# Patient Record
Sex: FEMALE | Race: WHITE | NOT HISPANIC OR LATINO | Employment: FULL TIME | URBAN - METROPOLITAN AREA
[De-identification: names, ages, dates, MRNs, and addresses within clinical notes are randomized per-mention and may not be internally consistent; named-entity substitution may affect disease eponyms.]

---

## 2021-07-29 ENCOUNTER — HOSPITAL ENCOUNTER (EMERGENCY)
Facility: HOSPITAL | Age: 31
Discharge: HOME | End: 2021-07-29
Admitting: EMERGENCY MEDICINE

## 2021-07-29 VITALS
RESPIRATION RATE: 18 BRPM | HEART RATE: 93 BPM | WEIGHT: 185 LBS | OXYGEN SATURATION: 97 % | TEMPERATURE: 96.9 F | BODY MASS INDEX: 34.93 KG/M2 | DIASTOLIC BLOOD PRESSURE: 79 MMHG | HEIGHT: 61 IN | SYSTOLIC BLOOD PRESSURE: 109 MMHG

## 2021-07-29 DIAGNOSIS — T19.2XXA FOREIGN BODY IN VAGINA, INITIAL ENCOUNTER: Primary | ICD-10-CM

## 2021-07-29 PROCEDURE — 99282 EMERGENCY DEPT VISIT SF MDM: CPT

## 2021-07-29 PROCEDURE — 99283 EMERGENCY DEPT VISIT LOW MDM: CPT

## 2023-06-14 ENCOUNTER — APPOINTMENT (OUTPATIENT)
Dept: CT IMAGING | Facility: HOSPITAL | Age: 33
End: 2023-06-14
Payer: MEDICAID

## 2023-06-14 ENCOUNTER — HOSPITAL ENCOUNTER (INPATIENT)
Facility: HOSPITAL | Age: 33
LOS: 2 days | Discharge: HOME | End: 2023-06-16
Attending: STUDENT IN AN ORGANIZED HEALTH CARE EDUCATION/TRAINING PROGRAM | Admitting: INTERNAL MEDICINE
Payer: MEDICAID

## 2023-06-14 DIAGNOSIS — Z87.891 HISTORY OF TOBACCO ABUSE: ICD-10-CM

## 2023-06-14 DIAGNOSIS — L97.929: Primary | ICD-10-CM

## 2023-06-14 DIAGNOSIS — L03.116 CELLULITIS OF LEFT LOWER EXTREMITY: ICD-10-CM

## 2023-06-14 DIAGNOSIS — F19.11 HISTORY OF SUBSTANCE ABUSE: ICD-10-CM

## 2023-06-14 DIAGNOSIS — M79.605 LEFT LEG PAIN: ICD-10-CM

## 2023-06-14 DIAGNOSIS — F31.77 BIPOLAR DISORDER, IN PARTIAL REMISSION, MOST RECENT EPISODE MIXED: ICD-10-CM

## 2023-06-14 DIAGNOSIS — Z86.59 HISTORY OF BIPOLAR DISORDER: ICD-10-CM

## 2023-06-14 LAB
ALBUMIN SERPL-MCNC: 4 G/DL (ref 3.5–5.2)
ALBUMIN/GLOB SERPL: 1.2 G/DL
ALP SERPL-CCNC: 84 U/L (ref 39–117)
ALT SERPL W P-5'-P-CCNC: 8 U/L (ref 1–33)
ANION GAP SERPL CALCULATED.3IONS-SCNC: 9 MMOL/L (ref 5–15)
AST SERPL-CCNC: 22 U/L (ref 1–32)
BASOPHILS # BLD AUTO: 0.06 10*3/MM3 (ref 0–0.2)
BASOPHILS NFR BLD AUTO: 0.6 % (ref 0–1.5)
BILIRUB SERPL-MCNC: 0.2 MG/DL (ref 0–1.2)
BUN SERPL-MCNC: 15 MG/DL (ref 6–20)
BUN/CREAT SERPL: 21.4 (ref 7–25)
CALCIUM SPEC-SCNC: 8.9 MG/DL (ref 8.6–10.5)
CHLORIDE SERPL-SCNC: 105 MMOL/L (ref 98–107)
CO2 SERPL-SCNC: 26 MMOL/L (ref 22–29)
CREAT SERPL-MCNC: 0.7 MG/DL (ref 0.57–1)
CRP SERPL-MCNC: 0.85 MG/DL (ref 0–0.5)
D-LACTATE SERPL-SCNC: 0.6 MMOL/L (ref 0.5–2)
DEPRECATED RDW RBC AUTO: 39.6 FL (ref 37–54)
EGFRCR SERPLBLD CKD-EPI 2021: 116.6 ML/MIN/1.73
EOSINOPHIL # BLD AUTO: 0.26 10*3/MM3 (ref 0–0.4)
EOSINOPHIL NFR BLD AUTO: 2.8 % (ref 0.3–6.2)
ERYTHROCYTE [DISTWIDTH] IN BLOOD BY AUTOMATED COUNT: 12.5 % (ref 12.3–15.4)
GLOBULIN UR ELPH-MCNC: 3.3 GM/DL
GLUCOSE SERPL-MCNC: 108 MG/DL (ref 65–99)
HCT VFR BLD AUTO: 40.3 % (ref 34–46.6)
HGB BLD-MCNC: 13.3 G/DL (ref 12–15.9)
HOLD SPECIMEN: NORMAL
HOLD SPECIMEN: NORMAL
IMM GRANULOCYTES # BLD AUTO: 0.04 10*3/MM3 (ref 0–0.05)
IMM GRANULOCYTES NFR BLD AUTO: 0.4 % (ref 0–0.5)
LYMPHOCYTES # BLD AUTO: 3.5 10*3/MM3 (ref 0.7–3.1)
LYMPHOCYTES NFR BLD AUTO: 37.7 % (ref 19.6–45.3)
MCH RBC QN AUTO: 28.7 PG (ref 26.6–33)
MCHC RBC AUTO-ENTMCNC: 33 G/DL (ref 31.5–35.7)
MCV RBC AUTO: 86.9 FL (ref 79–97)
MONOCYTES # BLD AUTO: 0.69 10*3/MM3 (ref 0.1–0.9)
MONOCYTES NFR BLD AUTO: 7.4 % (ref 5–12)
NEUTROPHILS NFR BLD AUTO: 4.73 10*3/MM3 (ref 1.7–7)
NEUTROPHILS NFR BLD AUTO: 51.1 % (ref 42.7–76)
NRBC BLD AUTO-RTO: 0 /100 WBC (ref 0–0.2)
PLATELET # BLD AUTO: 279 10*3/MM3 (ref 140–450)
PMV BLD AUTO: 9 FL (ref 6–12)
POTASSIUM SERPL-SCNC: 4 MMOL/L (ref 3.5–5.2)
PROT SERPL-MCNC: 7.3 G/DL (ref 6–8.5)
RBC # BLD AUTO: 4.64 10*6/MM3 (ref 3.77–5.28)
SODIUM SERPL-SCNC: 140 MMOL/L (ref 136–145)
WBC NRBC COR # BLD: 9.28 10*3/MM3 (ref 3.4–10.8)
WHOLE BLOOD HOLD COAG: NORMAL
WHOLE BLOOD HOLD SPECIMEN: NORMAL

## 2023-06-14 PROCEDURE — 25510000001 IOPAMIDOL 61 % SOLUTION: Performed by: STUDENT IN AN ORGANIZED HEALTH CARE EDUCATION/TRAINING PROGRAM

## 2023-06-14 PROCEDURE — 85025 COMPLETE CBC W/AUTO DIFF WBC: CPT

## 2023-06-14 PROCEDURE — 80053 COMPREHEN METABOLIC PANEL: CPT | Performed by: STUDENT IN AN ORGANIZED HEALTH CARE EDUCATION/TRAINING PROGRAM

## 2023-06-14 PROCEDURE — 84145 PROCALCITONIN (PCT): CPT | Performed by: PHYSICIAN ASSISTANT

## 2023-06-14 PROCEDURE — 73701 CT LOWER EXTREMITY W/DYE: CPT

## 2023-06-14 PROCEDURE — 87040 BLOOD CULTURE FOR BACTERIA: CPT | Performed by: PHYSICIAN ASSISTANT

## 2023-06-14 PROCEDURE — 87077 CULTURE AEROBIC IDENTIFY: CPT | Performed by: PHYSICIAN ASSISTANT

## 2023-06-14 PROCEDURE — 83605 ASSAY OF LACTIC ACID: CPT | Performed by: PHYSICIAN ASSISTANT

## 2023-06-14 PROCEDURE — 87205 SMEAR GRAM STAIN: CPT | Performed by: PHYSICIAN ASSISTANT

## 2023-06-14 PROCEDURE — 87070 CULTURE OTHR SPECIMN AEROBIC: CPT | Performed by: PHYSICIAN ASSISTANT

## 2023-06-14 PROCEDURE — 87186 SC STD MICRODIL/AGAR DIL: CPT | Performed by: PHYSICIAN ASSISTANT

## 2023-06-14 PROCEDURE — 86140 C-REACTIVE PROTEIN: CPT | Performed by: PHYSICIAN ASSISTANT

## 2023-06-14 PROCEDURE — 81025 URINE PREGNANCY TEST: CPT | Performed by: PHYSICIAN ASSISTANT

## 2023-06-14 RX ORDER — SACCHAROMYCES BOULARDII 250 MG
250 CAPSULE ORAL 2 TIMES DAILY
Status: DISCONTINUED | OUTPATIENT
Start: 2023-06-14 | End: 2023-06-16 | Stop reason: HOSPADM

## 2023-06-14 RX ORDER — NICOTINE 21 MG/24HR
1 PATCH, TRANSDERMAL 24 HOURS TRANSDERMAL
Status: DISCONTINUED | OUTPATIENT
Start: 2023-06-14 | End: 2023-06-16 | Stop reason: HOSPADM

## 2023-06-14 RX ORDER — SODIUM CHLORIDE 0.9 % (FLUSH) 0.9 %
10 SYRINGE (ML) INJECTION AS NEEDED
Status: DISCONTINUED | OUTPATIENT
Start: 2023-06-14 | End: 2023-06-16 | Stop reason: HOSPADM

## 2023-06-14 RX ADMIN — IOPAMIDOL 90 ML: 612 INJECTION, SOLUTION INTRAVENOUS at 22:36

## 2023-06-14 NOTE — Clinical Note
Level of Care: Telemetry [5]   Diagnosis: Non-healing ulcer of lower leg, left, with unspecified severity [[id number]]   Admitting Physician: AGUSTO FLOR [[id number]]   Attending Physician: AGUSTO FLOR [[id number]]   Certification: I Certify That Inpatient Hospital Services Are Medically Necessary For Greater Than 2 Midnights

## 2023-06-15 LAB
ALBUMIN SERPL-MCNC: 3.5 G/DL (ref 3.5–5.2)
ALBUMIN/GLOB SERPL: 1.2 G/DL
ALP SERPL-CCNC: 76 U/L (ref 39–117)
ALT SERPL W P-5'-P-CCNC: 5 U/L (ref 1–33)
AMPHET+METHAMPHET UR QL: NEGATIVE
AMPHETAMINES UR QL: NEGATIVE
ANION GAP SERPL CALCULATED.3IONS-SCNC: 7 MMOL/L (ref 5–15)
AST SERPL-CCNC: 21 U/L (ref 1–32)
B-HCG UR QL: NEGATIVE
BACTERIA UR QL AUTO: ABNORMAL /HPF
BARBITURATES UR QL SCN: NEGATIVE
BENZODIAZ UR QL SCN: NEGATIVE
BILIRUB SERPL-MCNC: 0.3 MG/DL (ref 0–1.2)
BILIRUB UR QL STRIP: NEGATIVE
BUN SERPL-MCNC: 13 MG/DL (ref 6–20)
BUN/CREAT SERPL: 19.4 (ref 7–25)
BUPRENORPHINE SERPL-MCNC: NEGATIVE NG/ML
CALCIUM SPEC-SCNC: 8.2 MG/DL (ref 8.6–10.5)
CANNABINOIDS SERPL QL: NEGATIVE
CHLORIDE SERPL-SCNC: 104 MMOL/L (ref 98–107)
CLARITY UR: CLEAR
CO2 SERPL-SCNC: 25 MMOL/L (ref 22–29)
COCAINE UR QL: NEGATIVE
COD CRY URNS QL: ABNORMAL /HPF
COLOR UR: YELLOW
CREAT SERPL-MCNC: 0.67 MG/DL (ref 0.57–1)
DEPRECATED RDW RBC AUTO: 39.4 FL (ref 37–54)
EGFRCR SERPLBLD CKD-EPI 2021: 117.8 ML/MIN/1.73
ERYTHROCYTE [DISTWIDTH] IN BLOOD BY AUTOMATED COUNT: 12.3 % (ref 12.3–15.4)
ERYTHROCYTE [SEDIMENTATION RATE] IN BLOOD: 15 MM/HR (ref 0–20)
EXPIRATION DATE: NORMAL
FENTANYL UR-MCNC: POSITIVE NG/ML
GLOBULIN UR ELPH-MCNC: 2.9 GM/DL
GLUCOSE SERPL-MCNC: 77 MG/DL (ref 65–99)
GLUCOSE UR STRIP-MCNC: NEGATIVE MG/DL
HCT VFR BLD AUTO: 37.9 % (ref 34–46.6)
HGB BLD-MCNC: 12.9 G/DL (ref 12–15.9)
HGB UR QL STRIP.AUTO: ABNORMAL
HOLD SPECIMEN: NORMAL
HYALINE CASTS UR QL AUTO: ABNORMAL /LPF
INTERNAL NEGATIVE CONTROL: NORMAL
INTERNAL POSITIVE CONTROL: NORMAL
KETONES UR QL STRIP: NEGATIVE
LEUKOCYTE ESTERASE UR QL STRIP.AUTO: NEGATIVE
Lab: NORMAL
MCH RBC QN AUTO: 29.5 PG (ref 26.6–33)
MCHC RBC AUTO-ENTMCNC: 34 G/DL (ref 31.5–35.7)
MCV RBC AUTO: 86.7 FL (ref 79–97)
METHADONE UR QL SCN: POSITIVE
NITRITE UR QL STRIP: NEGATIVE
OPIATES UR QL: NEGATIVE
OXYCODONE UR QL SCN: NEGATIVE
PCP UR QL SCN: NEGATIVE
PH UR STRIP.AUTO: 5.5 [PH] (ref 5–8)
PLATELET # BLD AUTO: 239 10*3/MM3 (ref 140–450)
PMV BLD AUTO: 9 FL (ref 6–12)
POTASSIUM SERPL-SCNC: 3.8 MMOL/L (ref 3.5–5.2)
PROCALCITONIN SERPL-MCNC: <0.02 NG/ML (ref 0–0.25)
PROPOXYPH UR QL: NEGATIVE
PROT SERPL-MCNC: 6.4 G/DL (ref 6–8.5)
PROT UR QL STRIP: NEGATIVE
RBC # BLD AUTO: 4.37 10*6/MM3 (ref 3.77–5.28)
RBC # UR STRIP: ABNORMAL /HPF
REF LAB TEST METHOD: ABNORMAL
SODIUM SERPL-SCNC: 136 MMOL/L (ref 136–145)
SP GR UR STRIP: 1.05 (ref 1–1.03)
SQUAMOUS #/AREA URNS HPF: ABNORMAL /HPF
TRICYCLICS UR QL SCN: NEGATIVE
UROBILINOGEN UR QL STRIP: ABNORMAL
WBC # UR STRIP: ABNORMAL /HPF
WBC NRBC COR # BLD: 6.81 10*3/MM3 (ref 3.4–10.8)

## 2023-06-15 PROCEDURE — 80307 DRUG TEST PRSMV CHEM ANLYZR: CPT | Performed by: PHYSICIAN ASSISTANT

## 2023-06-15 PROCEDURE — 25010000002 PIPERACILLIN SOD-TAZOBACTAM PER 1 G: Performed by: PHYSICIAN ASSISTANT

## 2023-06-15 PROCEDURE — 97597 DBRDMT OPN WND 1ST 20 CM/<: CPT

## 2023-06-15 PROCEDURE — 85027 COMPLETE CBC AUTOMATED: CPT | Performed by: PHYSICIAN ASSISTANT

## 2023-06-15 PROCEDURE — 97161 PT EVAL LOW COMPLEX 20 MIN: CPT

## 2023-06-15 PROCEDURE — 85652 RBC SED RATE AUTOMATED: CPT | Performed by: PHYSICIAN ASSISTANT

## 2023-06-15 PROCEDURE — 25010000002 CEFTRIAXONE PER 250 MG: Performed by: INTERNAL MEDICINE

## 2023-06-15 PROCEDURE — 81001 URINALYSIS AUTO W/SCOPE: CPT | Performed by: PHYSICIAN ASSISTANT

## 2023-06-15 PROCEDURE — 25010000002 VANCOMYCIN 10 G RECONSTITUTED SOLUTION: Performed by: PHYSICIAN ASSISTANT

## 2023-06-15 PROCEDURE — 80053 COMPREHEN METABOLIC PANEL: CPT | Performed by: PHYSICIAN ASSISTANT

## 2023-06-15 PROCEDURE — 25010000002 VANCOMYCIN 10 G RECONSTITUTED SOLUTION

## 2023-06-15 RX ORDER — NALOXONE HCL 0.4 MG/ML
0.4 VIAL (ML) INJECTION AS NEEDED
Status: DISCONTINUED | OUTPATIENT
Start: 2023-06-15 | End: 2023-06-16 | Stop reason: HOSPADM

## 2023-06-15 RX ORDER — SODIUM CHLORIDE 0.9 % (FLUSH) 0.9 %
10 SYRINGE (ML) INJECTION AS NEEDED
Status: DISCONTINUED | OUTPATIENT
Start: 2023-06-15 | End: 2023-06-16 | Stop reason: HOSPADM

## 2023-06-15 RX ORDER — BISACODYL 5 MG/1
5 TABLET, DELAYED RELEASE ORAL DAILY PRN
Status: DISCONTINUED | OUTPATIENT
Start: 2023-06-15 | End: 2023-06-16 | Stop reason: HOSPADM

## 2023-06-15 RX ORDER — ACETAMINOPHEN 325 MG/1
650 TABLET ORAL EVERY 4 HOURS PRN
Status: DISCONTINUED | OUTPATIENT
Start: 2023-06-15 | End: 2023-06-16 | Stop reason: HOSPADM

## 2023-06-15 RX ORDER — FLUOXETINE HYDROCHLORIDE 20 MG/1
20 CAPSULE ORAL DAILY
COMMUNITY

## 2023-06-15 RX ORDER — METHADONE HYDROCHLORIDE 10 MG/1
80 TABLET ORAL DAILY
Status: DISCONTINUED | OUTPATIENT
Start: 2023-06-15 | End: 2023-06-16 | Stop reason: HOSPADM

## 2023-06-15 RX ORDER — ONDANSETRON 2 MG/ML
4 INJECTION INTRAMUSCULAR; INTRAVENOUS EVERY 6 HOURS PRN
Status: DISCONTINUED | OUTPATIENT
Start: 2023-06-15 | End: 2023-06-16 | Stop reason: HOSPADM

## 2023-06-15 RX ORDER — SODIUM CHLORIDE 0.9 % (FLUSH) 0.9 %
10 SYRINGE (ML) INJECTION EVERY 12 HOURS SCHEDULED
Status: DISCONTINUED | OUTPATIENT
Start: 2023-06-15 | End: 2023-06-16 | Stop reason: HOSPADM

## 2023-06-15 RX ORDER — METHADONE HYDROCHLORIDE 10 MG/1
10 TABLET ORAL DAILY
COMMUNITY

## 2023-06-15 RX ORDER — HYDROXYZINE HYDROCHLORIDE 25 MG/1
25 TABLET, FILM COATED ORAL 3 TIMES DAILY PRN
COMMUNITY

## 2023-06-15 RX ORDER — BISACODYL 10 MG
10 SUPPOSITORY, RECTAL RECTAL DAILY PRN
Status: DISCONTINUED | OUTPATIENT
Start: 2023-06-15 | End: 2023-06-16 | Stop reason: HOSPADM

## 2023-06-15 RX ORDER — SODIUM CHLORIDE 9 MG/ML
40 INJECTION, SOLUTION INTRAVENOUS AS NEEDED
Status: DISCONTINUED | OUTPATIENT
Start: 2023-06-15 | End: 2023-06-16 | Stop reason: HOSPADM

## 2023-06-15 RX ORDER — CHOLECALCIFEROL (VITAMIN D3) 125 MCG
5 CAPSULE ORAL NIGHTLY PRN
Status: DISCONTINUED | OUTPATIENT
Start: 2023-06-15 | End: 2023-06-16 | Stop reason: HOSPADM

## 2023-06-15 RX ORDER — ARIPIPRAZOLE 15 MG/1
15 TABLET ORAL DAILY
COMMUNITY

## 2023-06-15 RX ORDER — BUSPIRONE HYDROCHLORIDE 10 MG/1
10 TABLET ORAL 2 TIMES DAILY
COMMUNITY

## 2023-06-15 RX ORDER — POLYETHYLENE GLYCOL 3350 17 G/17G
17 POWDER, FOR SOLUTION ORAL DAILY PRN
Status: DISCONTINUED | OUTPATIENT
Start: 2023-06-15 | End: 2023-06-16 | Stop reason: HOSPADM

## 2023-06-15 RX ORDER — CHOLECALCIFEROL (VITAMIN D3) 125 MCG
5 CAPSULE ORAL
COMMUNITY

## 2023-06-15 RX ORDER — AMOXICILLIN 250 MG
2 CAPSULE ORAL 2 TIMES DAILY
Status: DISCONTINUED | OUTPATIENT
Start: 2023-06-15 | End: 2023-06-16 | Stop reason: HOSPADM

## 2023-06-15 RX ADMIN — Medication 250 MG: at 21:05

## 2023-06-15 RX ADMIN — Medication 250 MG: at 03:21

## 2023-06-15 RX ADMIN — ACETAMINOPHEN 650 MG: 325 TABLET ORAL at 03:21

## 2023-06-15 RX ADMIN — ACETAMINOPHEN 650 MG: 325 TABLET ORAL at 21:05

## 2023-06-15 RX ADMIN — TAZOBACTAM SODIUM AND PIPERACILLIN SODIUM 3.38 G: 375; 3 INJECTION, SOLUTION INTRAVENOUS at 00:33

## 2023-06-15 RX ADMIN — VANCOMYCIN HYDROCHLORIDE 1250 MG: 10 INJECTION, POWDER, LYOPHILIZED, FOR SOLUTION INTRAVENOUS at 11:44

## 2023-06-15 RX ADMIN — Medication 1 PATCH: at 00:33

## 2023-06-15 RX ADMIN — Medication 5 MG: at 03:21

## 2023-06-15 RX ADMIN — VANCOMYCIN HYDROCHLORIDE 1750 MG: 10 INJECTION, POWDER, LYOPHILIZED, FOR SOLUTION INTRAVENOUS at 01:23

## 2023-06-15 RX ADMIN — Medication 10 ML: at 08:22

## 2023-06-15 RX ADMIN — Medication 250 MG: at 08:19

## 2023-06-15 RX ADMIN — Medication 5 MG: at 21:04

## 2023-06-15 RX ADMIN — METHADONE HYDROCHLORIDE 80 MG: 10 TABLET ORAL at 10:41

## 2023-06-15 RX ADMIN — SODIUM CHLORIDE 2 G: 900 INJECTION INTRAVENOUS at 08:20

## 2023-06-15 RX ADMIN — ACETAMINOPHEN 650 MG: 325 TABLET ORAL at 12:42

## 2023-06-15 RX ADMIN — SENNOSIDES AND DOCUSATE SODIUM 2 TABLET: 50; 8.6 TABLET ORAL at 08:19

## 2023-06-15 NOTE — NURSING NOTE
WOC consulted for: LLE wound    Upon review of chart, Vascular surgery has been consulted but not yet seen the patient.  PT Wound care have also been consulted for same wound, wound culture has already been collected.  Given this information and discussion with PT Wound Care therapist, PT Wound Care will continue to follow.  Informed bedside RN that PT Wound care would be following this patient @ 10:43.    Sensory Perception: 4-->no impairment  Moisture: 4-->rarely moist  Activity: 4-->walks frequently  Mobility: 4-->no limitation  Nutrition: 3-->adequate  Friction and Shear: 3-->no apparent problem  Ministerio Score: 22 (06/15/23 0400)    Please implement pressure injury interventions per protocol.    See order recommendations.    Thank you for the consult.  If alteration to skin integrity or worsening wound presentation, please contact WOC team.

## 2023-06-15 NOTE — PLAN OF CARE
Goal Outcome Evaluation:  Plan of Care Reviewed With: patient        Progress: improving  Outcome Evaluation: VSS on RA. A&Ox4. up ad kourtney in room. PT wound consulted. PT tolerated debridement and dressin of LLE wound. Methadone restarted at 80m per DR. Marsh. NO other needs at this time.

## 2023-06-15 NOTE — CASE MANAGEMENT/SOCIAL WORK
Discharge Planning Assessment  [facility]     Patient Name: Elda Salazar  MRN: [id number]  Today's Date: 6/15/2023    Admit Date: 6/14/2023    Plan: discharge plan   Discharge Needs Assessment       Row Name 06/15/23 1214       Living Environment    People in Home spouse    Name(s) of People in Home Carlos Salazar(spouse)    Potentially Unsafe Housing Conditions none    Primary Care Provided by spouse/significant other    Provides Primary Care For no one    Family Caregiver if Needed spouse    Family Caregiver Names Carlos Salazar(spouse)    Quality of Family Relationships supportive    Able to Return to Prior Arrangements yes    Living Arrangement Comments I met with pt at bedside with permission regarding discharge plan. Pt resides in [facility] with spouse, Carlos       Resource/Environmental Concerns    Resource/Environmental Concerns other (see comments)  Denies concerns       Food Insecurity    Within the past 12 months, you worried that your food would run out before you got the money to buy more. Never true       Transition Planning    Patient/Family Anticipates Transition to home with family    Patient/Family Anticipated Services at Transition     Transportation Anticipated car, drives self;family or friend will provide       Discharge Needs Assessment    Readmission Within the Last 30 Days no previous admission in last 30 days    Current Outpatient/Agency/Support Group outpatient substance abuse treatment    Equipment Currently Used at Home none    Concerns to be Addressed discharge planning    Anticipated Changes Related to Illness other (see comments)  Denies    Equipment Needed After Discharge none    Discharge Coordination/Progress Pt confirms that she has [facility] [employer] [facility] [place] insurane with prescription coverage. Pt uses [facility] on [address]. Pt is independent with ADLs and denies using any HH or DME. Pt has a history of bipolar disorder, prior  history of substance abule  and is here with non healing ulcer of lower leg. Plan is home at discharge. If wound therapy is needed at discharge, pt reports she can go outpatient. CM will cont to follow                   Discharge Plan       Row Name 06/15/23 1220       Plan    Plan discharge plan    Plan Comments Pt has a history of bipolar disorder, prior history of substance abule and is here with non healing ulcer of lower leg. Plan is home at discharge. If wound therapy is needed at discharge, pt reports she can go outpatient. CM will cont to follow    Final Discharge Disposition Code 01 - home or self-care                  Continued Care and Services - Admitted Since 6/14/2023    Coordination has not been started for this encounter.       Expected Discharge Date and Time       Expected Discharge Date Expected Discharge Time    Jun 19, 2023            Demographic Summary       Row Name 06/15/23 1212       General Information    General Information Comments PCP is BRENDA CASTILLO       Contact Information    Permission Granted to Share Info With     Contact Information Obtained for     Contact Information Comments aCrlos Salazar(spouse) [phone]                   Functional Status       Row Name 06/15/23 1213       Functional Status    Functional Status Comments Pt reports she is independent with ADLs                   Psychosocial    No documentation.                  Abuse/Neglect    No documentation.                  Legal    No documentation.                  Substance Abuse    No documentation.                  Patient Forms    No documentation.                     Ella Price, RN

## 2023-06-15 NOTE — H&P
"    [facility] Medicine Services  HISTORY AND PHYSICAL    Patient Name: Elda Salazar  : 1990  MRN: [id number]  Primary Care Physician: Genaro Baker MD  Date of admission: 2023      Subjective   Subjective     Chief Complaint:  Left leg wound, pain    HPI:  Elda Salazar is a 33 y.o. female with PMH of bipolar disorder, reported prior hx of substance abuse, chronic ongoing tobacco use who presents to the ER with complaints of wound to the posterior aspect of LLE at the calf with increasing pain, erythema, drainage, and foul odor over the last several days.     Patient is currently somnolent and fatigued and is not very interested in providing much history at this time. When asked when her wound to her left lower extremity started, she responds, \" I dont really know\".  She told the ER the wound started about 1 week ago.  She reports to me that she suffered a shaving accident and the wound has progressively worsened since that time. She has had worsening edema, warmth, tenderness, and foul-smelling drainage.  Denies any fever or chills.  Denies any other symptoms at this time.      Review of Systems   Gen- No fevers, chills  CV- No chest pain, palpitations  Resp- No cough, dyspnea  GI- No N/V/D, abd pain      Personal History     Past Medical History:   Diagnosis Date    Bipolar disorder     Polysubstance abuse              Past Surgical History:   Procedure Laterality Date    TONSILLECTOMY         Family History: family history includes Hypertension in her maternal grandmother.     Social History:  reports that she has been smoking cigarettes. She has been smoking an average of 1 pack per day. She does not have any smokeless tobacco history on file. She reports that she does not currently use alcohol. She reports that she does not currently use drugs.  Social History     Social History Narrative    Not on file       Medications:  Available home medication information " reviewed.  (Not in a hospital admission)      No Known Allergies    Objective   Objective     Vital Signs:   Temp:  [98.7 °F (37.1 °C)] 98.7 °F (37.1 °C)  Heart Rate:  [89] 89  Resp:  [20] 20  BP: (123)/(62) 123/62       Physical Exam   Constitutional: Somnolent, appears fatigued, will wake to verbal stimuli  Eyes: PERRLA, sclerae anicteric, no conjunctival injection  HENT: NCAT, mucous membranes moist  Neck: Supple, no thyromegaly, no lymphadenopathy, trachea midline  Respiratory: Clear to auscultation bilaterally, nonlabored respirations   Cardiovascular: RRR, no murmurs, rubs, or gallops, palpable pedal pulses bilaterally  Gastrointestinal: Positive bowel sounds, soft, nontender, nondistended  Musculoskeletal: No bilateral ankle edema, no clubbing or cyanosis to extremities  Psychiatric: Appropriate affect, cooperative  Neurologic: Oriented x 3, strength symmetric in all extremities, Cranial Nerves grossly intact to confrontation, speech clear  Skin: No rashes, LLE with ulcer to posterior calf with eschar/black necrosis centrally with foul odor and scant yellow bloody drainage, increased tenderness around wound without induration or crepitus, noted erythema around wound consistent with acute cellulitis with well demarcated margins      Result Review:  I have personally reviewed the results from the time of this admission to 6/14/2023 23:59 EDT and agree with these findings:  [x]  Laboratory list / accordion  []  Microbiology  [x]  Radiology  []  EKG/Telemetry   []  Cardiology/Vascular   []  Pathology  []  Old records  []  Other:  Most notable findings include:  CT with no evidence of abscess or gas.  WBC normal.  All personally reviewed        LAB RESULTS:      Lab 06/14/23 2039   WBC 9.28   HEMOGLOBIN 13.3   HEMATOCRIT 40.3   PLATELETS 279   NEUTROS ABS 4.73   IMMATURE GRANS (ABS) 0.04   LYMPHS ABS 3.50*   MONOS ABS 0.69   EOS ABS 0.26   MCV 86.9   CRP 0.85*   LACTATE 0.6         Lab 06/14/23 2039   SODIUM 140    POTASSIUM 4.0   CHLORIDE 105   CO2 26.0   ANION GAP 9.0   BUN 15   CREATININE 0.70   EGFR 116.6   GLUCOSE 108*   CALCIUM 8.9         Lab 06/14/23 2039   TOTAL PROTEIN 7.3   ALBUMIN 4.0   GLOBULIN 3.3   ALT (SGPT) 8   AST (SGOT) 22   BILIRUBIN 0.2   ALK PHOS 84                         Microbiology Results (last 10 days)       ** No results found for the last 240 hours. **            CT Lower Extremity Left With Contrast    Result Date: 6/14/2023  CT LOWER EXTREMITY LEFT W CONTRAST Date of Exam: 6/14/2023 10:31 PM EDT Indication: Non-healing wound of left calf. Comparison: None available. Technique: Axial CT images were obtained of the left lower extremity after the uneventful intravenous administration of 90 mL Isovue 300.  Reconstructed coronal and sagittal images were also obtained. Automated exposure control and iterative construction  methods were used. Findings: The osseous structures of the calf are intact, without evidence of fracture or suspicious lytic or sclerotic lesion. Consistent with provided history, there is a small focal area of ulceration noted posteriorly, with some mild underlying soft tissue thickening present. There is no evidence of focal fluid collection more specifically concerning for abscess and inflammatory change does not definitely extend deep to the adjacent fascia.     Impression: Impression: 14 mm ulcerated area along the posterior calf with some mild soft tissue edema consistent with provided history. There is no evidence of focal fluid collection concerning for abscess. Electronically Signed: Nilesh Werner  6/14/2023 10:54 PM EDT  Workstation ID: [id number]         Assessment & Plan   Assessment & Plan     Active Hospital Problems    Diagnosis  POA    **Non-healing ulcer of lower leg, left, with unspecified severity [L97.929]  Yes    History of substance abuse [F19.11]  Unknown    Bipolar disorder, in partial remission, most recent episode mixed [F31.77]  Unknown    Cellulitis of  "left lower extremity [L03.116]  Unknown     Pt is a 33 F with PMH of bipolar disorder, reported prior hx of substance abuse, chronic ongoing tobacco use who presents to the ER with complaints of wound to the posterior aspect of LLE at the calf with increasing pain, erythema, drainage, and foul odor over the last several days.     LLE open wound with cellulitis  --wound with eschar/necrotic tissue centrally. Will need debridement.  --Dr. Carroll agrees to follow. Appreciate assistance  --Vancomycin, rocephin 2g.   --wound and blood cultures.  --NPO until Dr. Carroll can eval    Bipolar disorder, mixed  --obtain home med list in am. Uses [facility]  --pt claims to be on unknown dose of abilify.  Busipirone 10mg bid, and hydroxyzine 25 mg bid    Hx of substance abuse  --reports she is on methadone 105mg daily.  Follows with \"[facility]\". Attempt to confirm in am  --reports cessation for 1.5 years. UDS pending    Chronic tobacco use  --nicotine patch  --cessation counseling    Total time spent: 75 minutes  Time spent includes time reviewing chart, face-to-face time, counseling patient/family/caregiver, ordering medications/tests/procedures, communicating with other health care professionals, documenting clinical information in the electronic health record, and coordination of care.      DVT prophylaxis: heparin      CODE STATUS: full code  Code Status and Medical Interventions:   Ordered at: 06/14/23 2330     Level Of Support Discussed With:    Patient     Code Status (Patient has no pulse and is not breathing):    CPR (Attempt to Resuscitate)     Medical Interventions (Patient has pulse or is breathing):    Full Support       Expected Discharge   Expected Discharge Date: 6/18/2023; Expected Discharge Time:      Esteban Cooper DO  06/14/23'  "

## 2023-06-15 NOTE — PAYOR COMM NOTE
"Aishwarya Pantoja (33 y.o. Female)     [id number]     Elizabet Hussein, TRICIA  Utilization Review  Klres-[phone]  [phone]        Date of Birth   1990    Social Security Number       [place]   [address] [facility] [address]    Home Phone   [phone]    MRN   [id number]       Episcopal   Latter day    Marital Status                               Admission Date   23    Admission Type   Emergency    Admitting Provider   Marilyn Marsh MD    Attending Provider   Marilyn Marsh MD    Department, Room/Bed   [facility] 6A, N604/1       Discharge Date       Discharge Disposition       Discharge Destination                                 Attending Provider: Marilyn Marsh MD    Allergies: No Known Allergies    Isolation: None   Infection: None   Code Status: CPR    Ht: 154.9 cm (61\")   Wt: 89.1 kg (196 lb 6.4 oz)    Admission Cmt: None   Principal Problem: Non-healing ulcer of lower leg, left, with unspecified severity [L97.929]                   Active Insurance as of 2023       Primary Coverage       Payor Plan Insurance Group Employer/Plan Group    [facility] COMMUNITY PLAN [facility] COMMUNITY PLAN Levine, Susan. \Hospital Has a New Name and Outlook.\""       Payor Plan Address Payor Plan Phone Number Payor Plan Fax Number Effective Dates    [address]   2023 - None Entered    [facility] [address]         Subscriber Name Subscriber Birth Date Member ID       AISHWARYA PANTOJA 1990 [id number]                     Emergency Contacts        (Rel.) Home Phone Work Phone Mobile Phone    YVROSE PANTOJA (Spouse) [phone] -- --                 History & Physical        Esteban Cooper DO at 23 [facility]              [facility]  HISTORY AND PHYSICAL    Patient Name: Aishwarya Pantoja  : 1990  MRN: [id number]  Primary Care Physician: Genaro Baker MD  Date of admission: 2023      Subjective   Subjective     Chief Complaint:  Left leg wound, " "pain    HPI:  Elda Salazar is a 33 y.o. female with PMH of bipolar disorder, reported prior hx of substance abuse, chronic ongoing tobacco use who presents to the ER with complaints of wound to the posterior aspect of LLE at the calf with increasing pain, erythema, drainage, and foul odor over the last several days.     Patient is currently somnolent and fatigued and is not very interested in providing much history at this time. When asked when her wound to her left lower extremity started, she responds, \" I dont really know\".  She told the ER the wound started about 1 week ago.  She reports to me that she suffered a shaving accident and the wound has progressively worsened since that time. She has had worsening edema, warmth, tenderness, and foul-smelling drainage.  Denies any fever or chills.  Denies any other symptoms at this time.      Review of Systems   Gen- No fevers, chills  CV- No chest pain, palpitations  Resp- No cough, dyspnea  GI- No N/V/D, abd pain      Personal History     Past Medical History:   Diagnosis Date    Bipolar disorder     Polysubstance abuse              Past Surgical History:   Procedure Laterality Date    TONSILLECTOMY         Family History: family history includes Hypertension in her maternal grandmother.     Social History:  reports that she has been smoking cigarettes. She has been smoking an average of 1 pack per day. She does not have any smokeless tobacco history on file. She reports that she does not currently use alcohol. She reports that she does not currently use drugs.  Social History     Social History Narrative    Not on file       Medications:  Available home medication information reviewed.  (Not in a hospital admission)      No Known Allergies    Objective   Objective     Vital Signs:   Temp:  [98.7 °F (37.1 °C)] 98.7 °F (37.1 °C)  Heart Rate:  [89] 89  Resp:  [20] 20  BP: (123)/(62) 123/62       Physical Exam   Constitutional: Somnolent, appears fatigued, will wake to " verbal stimuli  Eyes: PERRLA, sclerae anicteric, no conjunctival injection  HENT: NCAT, mucous membranes moist  Neck: Supple, no thyromegaly, no lymphadenopathy, trachea midline  Respiratory: Clear to auscultation bilaterally, nonlabored respirations   Cardiovascular: RRR, no murmurs, rubs, or gallops, palpable pedal pulses bilaterally  Gastrointestinal: Positive bowel sounds, soft, nontender, nondistended  Musculoskeletal: No bilateral ankle edema, no clubbing or cyanosis to extremities  Psychiatric: Appropriate affect, cooperative  Neurologic: Oriented x 3, strength symmetric in all extremities, Cranial Nerves grossly intact to confrontation, speech clear  Skin: No rashes, LLE with ulcer to posterior calf with eschar/black necrosis centrally with foul odor and scant yellow bloody drainage, increased tenderness around wound without induration or crepitus, noted erythema around wound consistent with acute cellulitis with well demarcated margins      Result Review:  I have personally reviewed the results from the time of this admission to 6/14/2023 23:59 EDT and agree with these findings:  [x]  Laboratory list / accordion  []  Microbiology  [x]  Radiology  []  EKG/Telemetry   []  Cardiology/Vascular   []  Pathology  []  Old records  []  Other:  Most notable findings include:  CT with no evidence of abscess or gas.  WBC normal.  All personally reviewed        LAB RESULTS:      Lab 06/14/23 2039   WBC 9.28   HEMOGLOBIN 13.3   HEMATOCRIT 40.3   PLATELETS 279   NEUTROS ABS 4.73   IMMATURE GRANS (ABS) 0.04   LYMPHS ABS 3.50*   MONOS ABS 0.69   EOS ABS 0.26   MCV 86.9   CRP 0.85*   LACTATE 0.6         Lab 06/14/23 2039   SODIUM 140   POTASSIUM 4.0   CHLORIDE 105   CO2 26.0   ANION GAP 9.0   BUN 15   CREATININE 0.70   EGFR 116.6   GLUCOSE 108*   CALCIUM 8.9         Lab 06/14/23 2039   TOTAL PROTEIN 7.3   ALBUMIN 4.0   GLOBULIN 3.3   ALT (SGPT) 8   AST (SGOT) 22   BILIRUBIN 0.2   ALK PHOS 84                          Microbiology Results (last 10 days)       ** No results found for the last 240 hours. **            CT Lower Extremity Left With Contrast    Result Date: 6/14/2023  CT LOWER EXTREMITY LEFT W CONTRAST Date of Exam: 6/14/2023 10:31 PM EDT Indication: Non-healing wound of left calf. Comparison: None available. Technique: Axial CT images were obtained of the left lower extremity after the uneventful intravenous administration of 90 mL Isovue 300.  Reconstructed coronal and sagittal images were also obtained. Automated exposure control and iterative construction  methods were used. Findings: The osseous structures of the calf are intact, without evidence of fracture or suspicious lytic or sclerotic lesion. Consistent with provided history, there is a small focal area of ulceration noted posteriorly, with some mild underlying soft tissue thickening present. There is no evidence of focal fluid collection more specifically concerning for abscess and inflammatory change does not definitely extend deep to the adjacent fascia.     Impression: Impression: 14 mm ulcerated area along the posterior calf with some mild soft tissue edema consistent with provided history. There is no evidence of focal fluid collection concerning for abscess. Electronically Signed: Nilesh Werner  6/14/2023 10:54 PM EDT  Workstation ID: [id number]         Assessment & Plan   Assessment & Plan     Active Hospital Problems    Diagnosis  POA    **Non-healing ulcer of lower leg, left, with unspecified severity [L97.929]  Yes    History of substance abuse [F19.11]  Unknown    Bipolar disorder, in partial remission, most recent episode mixed [F31.77]  Unknown    Cellulitis of left lower extremity [L03.116]  Unknown     Pt is a 33 F with PMH of bipolar disorder, reported prior hx of substance abuse, chronic ongoing tobacco use who presents to the ER with complaints of wound to the posterior aspect of LLE at the calf with increasing pain, erythema,  "drainage, and foul odor over the last several days.     LLE open wound with cellulitis  --wound with eschar/necrotic tissue centrally. Will need debridement.  --Dr. Carroll agrees to follow. Appreciate assistance  --Vancomycin, rocephin 2g.   --wound and blood cultures.  --NPO until Dr. Carroll can eval    Bipolar disorder, mixed  --obtain home med list in am. Uses [facility]  --pt claims to be on unknown dose of abilify.  Busipirone 10mg bid, and hydroxyzine 25 mg bid    Hx of substance abuse  --reports she is on methadone 105mg daily.  Follows with \"[facility]\". Attempt to confirm in am  --reports cessation for 1.5 years. UDS pending    Chronic tobacco use  --nicotine patch  --cessation counseling    Total time spent: 75 minutes  Time spent includes time reviewing chart, face-to-face time, counseling patient/family/caregiver, ordering medications/tests/procedures, communicating with other health care professionals, documenting clinical information in the electronic health record, and coordination of care.      DVT prophylaxis: heparin      CODE STATUS: full code  Code Status and Medical Interventions:   Ordered at: 06/14/23 2330     Level Of Support Discussed With:    Patient     Code Status (Patient has no pulse and is not breathing):    CPR (Attempt to Resuscitate)     Medical Interventions (Patient has pulse or is breathing):    Full Support       Expected Discharge   Expected Discharge Date: 6/18/2023; Expected Discharge Time:      Esteban Cooper DO  06/14/23'    Electronically signed by Esteban Cooper DO at 06/15/23 0005          Emergency Department Notes        Rosario Schilling Paramedic at 06/14/23 2145          Pt aware of  need for urine sample    Electronically signed by Rosario Schilling Paramedic at 06/14/23 2145       Khari Neff PA at 06/14/23 2111       Attestation signed by Gerald Bailey MD at 06/15/23 0645        NON FACE TO FACE: This visit was performed by " "BOTH a physician and an APC. I performed all aspects of the MDM as documented.  Gerald Bailey MD 6/15/2023 06:45 EDT                          EMERGENCY DEPARTMENT ENCOUNTER    Pt Name: Elda Salazar  MRN: [id number]  Pt :   1990  Room Number:    Date of encounter:  2023  PCP: Genaro Baker MD  ED Provider: BRAXTON Saucedo    Historian: Patient    HPI:  Chief Complaint: Left Leg Wound    Context: Elda Salazar is a 33 y.o. female who presents to the ED c/o a nonhealing wound to her left lower extremity.  Patient reports that approximately 1 week ago after shaving she noticed a small wound that has significantly grown in size and depth since that time she noticed it.  She states that she has been working on it and it is caused her increased discomfort.  Patient states that she wrapped the wound and noticed that has been draining and smells bad.  Patient has no significant past medical history.  Only prior diagnosis of bipolar disorder.  She denies any additional complaints on interview and exam.  HPI     REVIEW OF SYSTEMS  A chief complaint appropriate review of systems was completed and is negative except as noted in the HPI.     PAST MEDICAL HISTORY  Past Medical History:   Diagnosis Date    Bipolar disorder     Polysubstance abuse        PAST SURGICAL HISTORY  Past Surgical History:   Procedure Laterality Date    TONSILLECTOMY         FAMILY HISTORY  Family History   Problem Relation Age of Onset    Hypertension Maternal Grandmother        SOCIAL HISTORY  Social History     Socioeconomic History    Marital status:    Tobacco Use    Smoking status: Every Day     Packs/day: 1.00     Types: Cigarettes   Substance and Sexual Activity    Alcohol use: Not Currently    Drug use: Not Currently     Comment: reports 1.5 years \"clean\" from methamphetamine and heroin    Sexual activity: Defer       ALLERGIES  Patient has no known allergies.    PHYSICAL EXAM  Physical Exam  Vitals and " nursing note reviewed.   Constitutional:       General: She is not in acute distress.     Appearance: Normal appearance. She is not ill-appearing.   HENT:      Head: Normocephalic and atraumatic.      Nose: Nose normal.      Mouth/Throat:      Mouth: Mucous membranes are moist.   Eyes:      Extraocular Movements: Extraocular movements intact.   Cardiovascular:      Rate and Rhythm: Normal rate.   Pulmonary:      Effort: Pulmonary effort is normal.   Abdominal:      General: There is no distension.   Musculoskeletal:         General: Normal range of motion.      Cervical back: Normal range of motion and neck supple.      Comments: Approximate 2 cm wound to posterior of left calf. ##Please find photo detailing area of specific concern below##   Skin:     General: Skin is warm and dry.   Neurological:      General: No focal deficit present.      Mental Status: She is alert.   Psychiatric:         Mood and Affect: Mood normal.         Behavior: Behavior normal.         LAB RESULTS  Results for orders placed or performed during the hospital encounter of 06/14/23   Comprehensive Metabolic Panel    Specimen: Blood   Result Value Ref Range    Glucose 108 (H) 65 - 99 mg/dL    BUN 15 6 - 20 mg/dL    Creatinine 0.70 0.57 - 1.00 mg/dL    Sodium 140 136 - 145 mmol/L    Potassium 4.0 3.5 - 5.2 mmol/L    Chloride 105 98 - 107 mmol/L    CO2 26.0 22.0 - 29.0 mmol/L    Calcium 8.9 8.6 - 10.5 mg/dL    Total Protein 7.3 6.0 - 8.5 g/dL    Albumin 4.0 3.5 - 5.2 g/dL    ALT (SGPT) 8 1 - 33 U/L    AST (SGOT) 22 1 - 32 U/L    Alkaline Phosphatase 84 39 - 117 U/L    Total Bilirubin 0.2 0.0 - 1.2 mg/dL    Globulin 3.3 gm/dL    A/G Ratio 1.2 g/dL    BUN/Creatinine Ratio 21.4 7.0 - 25.0    Anion Gap 9.0 5.0 - 15.0 mmol/L    eGFR 116.6 >60.0 mL/min/1.73   CBC Auto Differential    Specimen: Blood   Result Value Ref Range    WBC 9.28 3.40 - 10.80 10*3/mm3    RBC 4.64 3.77 - 5.28 10*6/mm3    Hemoglobin 13.3 12.0 - 15.9 g/dL    Hematocrit 40.3 34.0  - 46.6 %    MCV 86.9 79.0 - 97.0 fL    MCH 28.7 26.6 - 33.0 pg    MCHC 33.0 31.5 - 35.7 g/dL    RDW 12.5 12.3 - 15.4 %    RDW-SD 39.6 37.0 - 54.0 fl    MPV 9.0 6.0 - 12.0 fL    Platelets 279 140 - 450 10*3/mm3    Neutrophil % 51.1 42.7 - 76.0 %    Lymphocyte % 37.7 19.6 - 45.3 %    Monocyte % 7.4 5.0 - 12.0 %    Eosinophil % 2.8 0.3 - 6.2 %    Basophil % 0.6 0.0 - 1.5 %    Immature Grans % 0.4 0.0 - 0.5 %    Neutrophils, Absolute 4.73 1.70 - 7.00 10*3/mm3    Lymphocytes, Absolute 3.50 (H) 0.70 - 3.10 10*3/mm3    Monocytes, Absolute 0.69 0.10 - 0.90 10*3/mm3    Eosinophils, Absolute 0.26 0.00 - 0.40 10*3/mm3    Basophils, Absolute 0.06 0.00 - 0.20 10*3/mm3    Immature Grans, Absolute 0.04 0.00 - 0.05 10*3/mm3    nRBC 0.0 0.0 - 0.2 /100 WBC   Lactic Acid, Plasma    Specimen: Blood   Result Value Ref Range    Lactate 0.6 0.5 - 2.0 mmol/L   Sedimentation Rate    Specimen: Blood   Result Value Ref Range    Sed Rate 15 0 - 20 mm/hr   C-reactive Protein    Specimen: Blood   Result Value Ref Range    C-Reactive Protein 0.85 (H) 0.00 - 0.50 mg/dL   Procalcitonin    Specimen: Blood   Result Value Ref Range    Procalcitonin <0.02 0.00 - 0.25 ng/mL   POC Urine Pregnancy    Specimen: Urine   Result Value Ref Range    HCG, Urine, QL Negative Negative    Lot Number [id number]     Internal Positive Control Passed Positive, Passed    Internal Negative Control Passed Negative, Passed    Expiration Date 08/03/2024    Green Top (Gel)   Result Value Ref Range    Extra Tube Hold for add-ons.    Lavender Top   Result Value Ref Range    Extra Tube hold for add-on    Gold Top - SST   Result Value Ref Range    Extra Tube Hold for add-ons.    Gray Top   Result Value Ref Range    Extra Tube Hold for add-ons.    Light Blue Top   Result Value Ref Range    Extra Tube Hold for add-ons.        If labs were ordered, I independently reviewed the results and considered them in treating the patient.    RADIOLOGY  CT Lower Extremity Left With Contrast    Final Result   Impression:   14 mm ulcerated area along the posterior calf with some mild soft tissue edema consistent with provided history. There is no evidence of focal fluid collection concerning for abscess.            Electronically Signed: Nilesh Werner     6/14/2023 10:54 PM EDT     Workstation ID: [id number]        [x] Radiologist's Report Reviewed:  I ordered and independently reviewed the above noted radiographic studies.  See radiologist's dictation for official interpretation.      PROCEDURES    Procedures    No orders to display       MEDICATIONS GIVEN IN ER    Medications   sodium chloride 0.9 % flush 10 mL (has no administration in time range)   sodium chloride 0.9 % flush 10 mL (has no administration in time range)   vancomycin 1750 mg/500 mL 0.9% NS IVPB ([facility]) (has no administration in time range)   Pharmacy to dose vancomycin (has no administration in time range)   cefTRIAXone (ROCEPHIN) 2 g/100 mL 0.9% NS IVPB (MBP) (has no administration in time range)   saccharomyces boulardii (FLORASTOR) capsule 250 mg (has no administration in time range)   nicotine (NICODERM CQ) 21 MG/24HR patch 1 patch (1 patch Transdermal Medication Applied 6/15/23 0033)   iopamidol (ISOVUE-300) 61 % injection 100 mL (90 mL Intravenous Given 6/14/23 2236)   piperacillin-tazobactam (ZOSYN) 3.375 g in iso-osmotic dextrose 50 ml (premix) (0 g Intravenous Stopped 6/15/23 0112)       MEDICAL DECISION MAKING, PROGRESS, and CONSULTS   Medical Decision Making  Problems Addressed:  History of bipolar disorder: chronic illness or injury  History of tobacco abuse: chronic illness or injury  Left leg pain: complicated acute illness or injury  Non-healing ulcer of lower leg, left, with unspecified severity: complicated acute illness or injury    Amount and/or Complexity of Data Reviewed  Labs: ordered.  Radiology: ordered.    Risk  Prescription drug management.  Decision regarding hospitalization.        All labs have been  independently reviewed by me.  All radiology studies have been reviewed by me and the radiologist dictating the report.  All EKG's have been independently viewed by me.    [] Discussed with radiology regarding test interpretation:    Discussion below represents my analysis of pertinent findings related to patient's condition, differential diagnosis, treatment plan and final disposition.    Differential diagnosis:  The differential diagnosis associated with the patient's presentation includes: Cellulitis, bug bite reaction, impetigo, drug eruption, ringworm or other fungal infection.     Additional sources  Discussed/ obtained information from independent historians:   [] Spouse  [] Parent  [] Family member  [] Friend  [] EMS   [] Other:  External (non-ED) record review:   [] Inpatient record:   [] Office record:   [] Outpatient record:   [] Prior Outpatient labs:   [] Prior Outpatient radiology:   [] Primary Care record:   [] Outside ED record:   [] Other:   Patient's care impacted by:   [] Diabetes  [] Hypertension  [] Hyperlipidemia  [] Hypothyroidism   [] Coronary Artery Disease   [] COPD   [] Cancer   [] Obesity  [] GERD   [x] Tobacco Abuse   [] Substance Abuse   [] Anxiety   [] Depression   [x] Other: Bipolar disorder  Care significantly affected by Social Determinants of Health (housing and economic circumstances, unemployment)    [] Yes     [x] No   If yes, Patient's care significantly limited by  Social Determinants of Health including:   [] Inadequate housing   [] Low income   [] Alcoholism and drug addiction in family   [] Problems related to primary support group   [] Unemployment   [] Problems related to employment   [] Other Social Determinants of Health:     Shared decision making:  I reviewed workup performed in ED including labs and imaging. Based on findings, recommendation made for admission. Patient is agreeable to plan of care and hospital admission.      Orders placed during this visit:  Orders  Placed This Encounter   Procedures    Blood Culture - Blood,    Blood Culture - Blood,    Wound Culture - Wound, Leg, Left    CT Lower Extremity Left With Contrast    [place] Draw    Comprehensive Metabolic Panel    CBC Auto Differential    Lactic Acid, Plasma    Urinalysis With Microscopic If Indicated (No Culture) - Urine, Clean Catch    Sedimentation Rate    C-reactive Protein    Procalcitonin    Urine Drug Screen - Urine, Clean Catch    Fentanyl, Urine - Urine, Clean Catch    Please call [facility] in am to obtain home med list  Nursing Communication    Code Status and Medical Interventions:    Inpatient Vascular Surgery Consult    POC Urine Pregnancy    Wound Ostomy Eval & Treat    Insert peripheral IV    Insert Peripheral IV    Inpatient Admission    ED Bed Request    CBC & Differential    Green Top (Gel)    Lavender Top    Gold Top - SST    Gray Top    Light Blue Top         ED Course:    ED Course as of 06/15/23 0118   Wed Jun 14, 2023 2121 I have consulted with Dr. Bailey who recommended a call to Dr. Carroll with vascular surgery.  [JG]   2123 I have consulted with Vascular Surgery, Dr. Carroll who was unfortunately unable see wound image as he did not have access to Epic. He is happy to follow patient on consult.  [JG]   2316 Hospitalist messaged for admission.  [JG]   2323 Hospitalist agreeable to accept patient for admission [JG]   Thu David 15, 2023   0111 In summary this is a 33 year old female with history of Bipolar disorder who presents to the ED with a non-healing wound to her left lower extremity. Denies any recent IV drug use or injuries. Labs obtained and reviewed demonstrate no acute or emergent abnormalities.  CT lower extremity demonstrates 14 mm ulcerated area along the posterior calf with some mild soft tissue edema and no evidence of focal fluid collection concerning for abscess. Blood and wound cultures obtained and patient initiated on broad spectrum antibiotics. Vascular  surgery consulted as directed by Dr. Bailey who will see patient in consult. Patient admitted to hospitalist service for further evaluation and treatment. At time of admission disposition patient is afebrile, nontoxic appearing, vital signs stable and able to maintain O2 sats of 97% on room air.  [JG]      ED Course User Index  [JG] Khari Neff PA            DIAGNOSIS  Final diagnoses:   Non-healing ulcer of lower leg, left, with unspecified severity   Left leg pain   History of bipolar disorder   History of tobacco abuse       DISPOSITION    ED Disposition       ED Disposition   Decision to Admit    Condition   --    Comment   Level of Care: Telemetry [5]   Diagnosis: Non-healing ulcer of lower leg, left, with unspecified severity [7175528]   Admitting Physician: AGUSTO FLOR [[id number]]   Attending Physician: AGUSTO FLOR [[id number]]                 Please note that portions of this document were completed with voice recognition software.        Khari Neff PA  06/15/23 0118      Electronically signed by Gerald Bailey MD at 06/15/23 0645       Vital Signs (last day)       Date/Time Temp Temp src Pulse Resp BP Patient Position SpO2    06/15/23 0746 98.4 (36.9) Oral 61 18 118/78 Lying --    06/15/23 0205 -- -- 86 -- 126/97 -- --    06/15/23 01:02:43 -- -- 61 17 128/96 -- 97    06/14/23 1857 98.7 (37.1) Oral 89 20 123/62 Sitting 98          Oxygen Therapy (last day)       Date/Time SpO2 Device (Oxygen Therapy) Flow (L/min) Oxygen Concentration (%) ETCO2 (mmHg)    06/15/23 1000 -- room air -- -- --    06/15/23 0815 -- room air -- -- --    06/15/23 01:02:43 97 -- -- -- --    06/14/23 1857 98 -- -- -- --          Lines, Drains & Airways       Active LDAs       Name Placement date Placement time Site Days    Peripheral IV 06/14/23 2144 Anterior;Left Forearm 06/14/23 2144  Forearm  less than 1                  Current Facility-Administered Medications   Medication Dose Route Frequency Provider Last Rate  Last Admin    acetaminophen (TYLENOL) tablet 650 mg  650 mg Oral Q4H PRN Gabriela Park PA-C   650 mg at 06/15/23 0321    sennosides-docusate (PERICOLACE) 8.6-50 MG per tablet 2 tablet  2 tablet Oral BID Gabriela Park PA-TEGAN   2 tablet at 06/15/23 0819    And    polyethylene glycol (MIRALAX) packet 17 g  17 g Oral Daily PRN Gabriela Park PA-C        And    bisacodyl (DULCOLAX) EC tablet 5 mg  5 mg Oral Daily PRN Gabriela Park PA-TEGAN        And    bisacodyl (DULCOLAX) suppository 10 mg  10 mg Rectal Daily PRN Gabriela Park PA-C        cefTRIAXone (ROCEPHIN) 2 g/100 mL 0.9% NS IVPB (MBP)  2 g Intravenous Q24H Esteban Cooper, DO   2 g at 06/15/23 0820    melatonin tablet 5 mg  5 mg Oral Nightly PRN Gabriela Park PA-C   5 mg at 06/15/23 0321    methadone (DOLOPHINE) tablet 80 mg  80 mg Oral Daily Marilyn Marsh MD   80 mg at 06/15/23 1041    naloxone (NARCAN) injection 0.4 mg  0.4 mg Intravenous PRN Esteban Cooper, DO        nicotine (NICODERM CQ) 21 MG/24HR patch 1 patch  1 patch Transdermal Q24H Esteban Cooper, DO   1 patch at 06/15/23 0033    ondansetron (ZOFRAN) injection 4 mg  4 mg Intravenous Q6H PRN Gabriela Park PA-C        Pharmacy to dose vancomycin   Does not apply Continuous PRN Esteban Cooper, DO        saccharomyces boulardii (FLORASTOR) capsule 250 mg  250 mg Oral BID Gabriela Park PA-C   250 mg at 06/15/23 0819    sodium chloride 0.9 % flush 10 mL  10 mL Intravenous PRN Gerald Bailey MD        sodium chloride 0.9 % flush 10 mL  10 mL Intravenous PRN Khari Neff PA        sodium chloride 0.9 % flush 10 mL  10 mL Intravenous Q12H Gabriela Park PA-C   10 mL at 06/15/23 0822    sodium chloride 0.9 % flush 10 mL  10 mL Intravenous PRN Gabriela Park PA-C        sodium chloride 0.9 % infusion 40 mL  40 mL Intravenous PRN Gabriela Park PA-C        vancomycin 1250 mg/250 mL 0.9% NS IVPB ([facility])  1,250 mg Intravenous Q12H Ky Rosario, PharmD          Lab Results (last 24 hours)       Procedure Component Value Units Date/Time    Wound Culture - Wound, Leg, Left [[id number]] Collected: 06/14/23 2140    Specimen: Wound from Leg, Left Updated: 06/15/23 0647     Gram Stain Rare (1+) WBCs seen      No organisms seen    Comprehensive Metabolic Panel [[id number]]  (Abnormal) Collected: 06/15/23 0441    Specimen: Blood Updated: 06/15/23 0548     Glucose 77 mg/dL      BUN 13 mg/dL      Creatinine 0.67 mg/dL      Sodium 136 mmol/L      Potassium 3.8 mmol/L      Chloride 104 mmol/L      CO2 25.0 mmol/L      Calcium 8.2 mg/dL      Total Protein 6.4 g/dL      Albumin 3.5 g/dL      ALT (SGPT) 5 U/L      AST (SGOT) 21 U/L      Alkaline Phosphatase 76 U/L      Total Bilirubin 0.3 mg/dL      Globulin 2.9 gm/dL      Comment: Calculated Result        A/G Ratio 1.2 g/dL      BUN/Creatinine Ratio 19.4     Anion Gap 7.0 mmol/L      eGFR 117.8 mL/min/1.73     Narrative:      GFR Normal >60  Chronic Kidney Disease <60  Kidney Failure <15      CBC (No Diff) [[id number]]  (Normal) Collected: 06/15/23 0441    Specimen: Blood Updated: 06/15/23 0505     WBC 6.81 10*3/mm3      RBC 4.37 10*6/mm3      Hemoglobin 12.9 g/dL      Hematocrit 37.9 %      MCV 86.7 fL      MCH 29.5 pg      MCHC 34.0 g/dL      RDW 12.3 %      RDW-SD 39.4 fl      MPV 9.0 fL      Platelets 239 10*3/mm3     Fentanyl, Urine - Urine, Clean Catch [[id number]]  (Abnormal) Collected: 06/15/23 0045    Specimen: Urine, Clean Catch Updated: 06/15/23 0154     Fentanyl, Urine Positive    Narrative:      Negative Threshold:      Fentanyl 5 ng/mL     The normal value for the drug tested is negative. This report includes final unconfirmed screening results to be used for medical treatment purposes only. Unconfirmed results must not be used for non-medical purposes such as employment or legal testing. Clinical consideration should be applied to any drug of abuse test, particularly when unconfirmed results are used.           Urinalysis  With Microscopic If Indicated (No Culture) - Urine, Clean Catch [[id number]]  (Abnormal) Collected: 06/15/23 0045    Specimen: Urine, Clean Catch Updated: 06/15/23 0151     Color, UA Yellow     Appearance, UA Clear     pH, UA 5.5     Specific Gravity, UA 1.047     Glucose, UA Negative     Ketones, UA Negative     Bilirubin, UA Negative     Blood, UA Trace     Protein, UA Negative     Leuk Esterase, UA Negative     Nitrite, UA Negative     Urobilinogen, UA 1.0 E.U./dL    Urinalysis, Microscopic Only - Urine, Clean Catch [[id number]]  (Abnormal) Collected: 06/15/23 0045    Specimen: Urine, Clean Catch Updated: 06/15/23 0151     RBC, UA 0-2 /HPF      WBC, UA 0-2 /HPF      Bacteria, UA None Seen /HPF      Squamous Epithelial Cells, UA 3-6 /HPF      Hyaline Casts, UA 0-6 /LPF      Calcium Oxalate Crystals, UA Small/1+ /HPF      Methodology Manual Light Microscopy    Urine Drug Screen - Urine, Clean Catch [[id number]]  (Abnormal) Collected: 06/15/23 0045    Specimen: Urine, Clean Catch Updated: 06/15/23 0128     THC, Screen, Urine Negative     Phencyclidine (PCP), Urine Negative     Cocaine Screen, Urine Negative     Methamphetamine, Ur Negative     Opiate Screen Negative     Amphetamine Screen, Urine Negative     Benzodiazepine Screen, Urine Negative     Tricyclic Antidepressants Screen Negative     Methadone Screen, Urine Positive     Barbiturates Screen, Urine Negative     Oxycodone Screen, Urine Negative     Propoxyphene Screen Negative     Buprenorphine, Screen, Urine Negative    Narrative:      Cutoff For Drugs Screened:    Amphetamines               500 ng/ml  Barbiturates               200 ng/ml  Benzodiazepines            150 ng/ml  Cocaine                    150 ng/ml  Methadone                  200 ng/ml  Opiates                    100 ng/ml  Phencyclidine               25 ng/ml  THC                            50 ng/ml  Methamphetamine            500 ng/ml  Tricyclic Antidepressants  300 ng/ml  Oxycodone                   100 ng/ml  Propoxyphene               300 ng/ml  Buprenorphine               10 ng/ml    The normal value for all drugs tested is negative. This report includes unconfirmed screening results, with the cutoff values listed, to be used for medical treatment purposes only.  Unconfirmed results must not be used for non-medical purposes such as employment or legal testing.  Clinical consideration should be applied to any drug of abuse test, particularly when unconfirmed results are used.      Sedimentation Rate [[id number]]  (Normal) Collected: 06/15/23 0034    Specimen: Blood Updated: 06/15/23 0103     Sed Rate 15 mm/hr     [place] Draw [[id number]] Collected: 06/14/23 2039    Specimen: Blood Updated: 06/15/23 0045    Narrative:      The following orders were created for panel order [place] Draw.  Procedure                               Abnormality         Status                     ---------                               -----------         ------                     Green Top (Gel)[[id number]]                                  Final result               Lavender Top[[id number]]                                     Final result               Gold Top - SST[[id number]]                                   Final result               Gray Top[[id number]]                                         Final result               Light Blue Top[[id number]]                                   Final result                 Please view results for these tests on the individual orders.    Gray Top [[id number]] Collected: 06/14/23 2039    Specimen: Blood Updated: 06/15/23 0045     Extra Tube Hold for add-ons.     Comment: Auto resulted.       POC Urine Pregnancy [[id number]]  (Normal) Collected: 06/15/23 0043    Specimen: Urine Updated: 06/15/23 0045     HCG, Urine, QL Negative     Lot Number [id number]     Internal Positive Control Passed     Internal Negative Control Passed     Expiration Date 08/03/2024    Procalcitonin [[id number]]  (Normal)  "Collected: 06/14/23 2039    Specimen: Blood Updated: 06/15/23 0009     Procalcitonin <0.02 ng/mL     Narrative:      As a Marker for Sepsis (Non-Neonates):    1. <0.5 ng/mL represents a low risk of severe sepsis and/or septic shock.  2. >2 ng/mL represents a high risk of severe sepsis and/or septic shock.    As a Marker for Lower Respiratory Tract Infections that require antibiotic therapy:    PCT on Admission    Antibiotic Therapy       6-12 Hrs later    >0.5                Strongly Recommended  >0.25 - <0.5        Recommended   0.1 - 0.25          Discouraged              Remeasure/reassess PCT  <0.1                Strongly Discouraged     Remeasure/reassess PCT    As 28 day mortality risk marker: \"Change in Procalcitonin Result\" (>80% or <=80%) if Day 0 (or Day 1) and Day 4 values are available. Refer to http://[email or web address]-[email or web address]    Change in PCT <=80%  A decrease of PCT levels below or equal to 80% defines a positive change in PCT test result representing a higher risk for 28-day all-cause mortality of patients diagnosed with severe sepsis for septic shock.    Change in PCT >80%  A decrease of PCT levels of more than 80% defines a negative change in PCT result representing a lower risk for 28-day all-cause mortality of patients diagnosed with severe sepsis or septic shock.       C-reactive Protein [[id number]]  (Abnormal) Collected: 06/14/23 2039    Specimen: Blood Updated: 06/14/23 2356     C-Reactive Protein 0.85 mg/dL     Blood Culture - Blood, Arm, Left [[id number]] Collected: 06/14/23 2115    Specimen: Blood from Arm, Left Updated: 06/14/23 2209    Blood Culture - Blood, Arm, Right [[id number]] Collected: 06/14/23 2130    Specimen: Blood from Arm, Right Updated: 06/14/23 2207    Green Top (Gel) [[id number]] Collected: 06/14/23 2039    Specimen: Blood Updated: 06/14/23 2146     Extra Tube Hold for add-ons.     Comment: Auto resulted.       Gold Top - SST [[id number]] Collected: 06/14/23 2039    " Specimen: Blood Updated: 06/14/23 2146     Extra Tube Hold for add-ons.     Comment: Auto resulted.       Light Blue Top [[id number]] Collected: 06/14/23 2039    Specimen: Blood Updated: 06/14/23 2146     Extra Tube Hold for add-ons.     Comment: Auto resulted       Lavender Top [[id number]] Collected: 06/14/23 2039    Specimen: Blood Updated: 06/14/23 2146     Extra Tube hold for add-on     Comment: Auto resulted       Lactic Acid, Plasma [[id number]]  (Normal) Collected: 06/14/23 2039    Specimen: Blood Updated: 06/14/23 2124     Lactate 0.6 mmol/L      Comment: Falsely depressed results may occur on samples drawn from patients receiving N-Acetylcysteine (NAC) or Metamizole.       Comprehensive Metabolic Panel [[id number]]  (Abnormal) Collected: 06/14/23 2039    Specimen: Blood Updated: 06/14/23 2107     Glucose 108 mg/dL      BUN 15 mg/dL      Creatinine 0.70 mg/dL      Sodium 140 mmol/L      Potassium 4.0 mmol/L      Comment: Slight hemolysis detected by analyzer. Results may be affected.        Chloride 105 mmol/L      CO2 26.0 mmol/L      Calcium 8.9 mg/dL      Total Protein 7.3 g/dL      Albumin 4.0 g/dL      ALT (SGPT) 8 U/L      AST (SGOT) 22 U/L      Alkaline Phosphatase 84 U/L      Total Bilirubin 0.2 mg/dL      Globulin 3.3 gm/dL      Comment: Calculated Result        A/G Ratio 1.2 g/dL      BUN/Creatinine Ratio 21.4     Anion Gap 9.0 mmol/L      eGFR 116.6 mL/min/1.73     Narrative:      GFR Normal >60  Chronic Kidney Disease <60  Kidney Failure <15      CBC & Differential [[id number]]  (Abnormal) Collected: 06/14/23 2039    Specimen: Blood Updated: 06/14/23 2047    Narrative:      The following orders were created for panel order CBC & Differential.  Procedure                               Abnormality         Status                     ---------                               -----------         ------                     CBC Auto Differential[[id number]]        Abnormal            Final result                  Please view results for these tests on the individual orders.    CBC Auto Differential [[id number]]  (Abnormal) Collected: 06/14/23 2039    Specimen: Blood Updated: 06/14/23 2047     WBC 9.28 10*3/mm3      RBC 4.64 10*6/mm3      Hemoglobin 13.3 g/dL      Hematocrit 40.3 %      MCV 86.9 fL      MCH 28.7 pg      MCHC 33.0 g/dL      RDW 12.5 %      RDW-SD 39.6 fl      MPV 9.0 fL      Platelets 279 10*3/mm3      Neutrophil % 51.1 %      Lymphocyte % 37.7 %      Monocyte % 7.4 %      Eosinophil % 2.8 %      Basophil % 0.6 %      Immature Grans % 0.4 %      Neutrophils, Absolute 4.73 10*3/mm3      Lymphocytes, Absolute 3.50 10*3/mm3      Monocytes, Absolute 0.69 10*3/mm3      Eosinophils, Absolute 0.26 10*3/mm3      Basophils, Absolute 0.06 10*3/mm3      Immature Grans, Absolute 0.04 10*3/mm3      nRBC 0.0 /100 WBC           Imaging Results (Last 24 Hours)       Procedure Component Value Units Date/Time    CT Lower Extremity Left With Contrast [[id number]] Collected: 06/14/23 2250     Updated: 06/14/23 2257    Narrative:      CT LOWER EXTREMITY LEFT W CONTRAST    Date of Exam: 6/14/2023 10:31 PM EDT    Indication: Non-healing wound of left calf.    Comparison: None available.    Technique: Axial CT images were obtained of the left lower extremity after the uneventful intravenous administration of 90 mL Isovue 300.  Reconstructed coronal and sagittal images were also obtained. Automated exposure control and iterative construction   methods were used.      Findings:  The osseous structures of the calf are intact, without evidence of fracture or suspicious lytic or sclerotic lesion. Consistent with provided history, there is a small focal area of ulceration noted posteriorly, with some mild underlying soft tissue   thickening present. There is no evidence of focal fluid collection more specifically concerning for abscess and inflammatory change does not definitely extend deep to the adjacent fascia.      Impression:       "Impression:  14 mm ulcerated area along the posterior calf with some mild soft tissue edema consistent with provided history. There is no evidence of focal fluid collection concerning for abscess.        Electronically Signed: Nilesh Werner    6/14/2023 10:54 PM EDT    Workstation ID: [id number]          ECG/EMG Results (last 24 hours)       ** No results found for the last 24 hours. **          Physician Progress Notes (last 24 hours)  Notes from 06/14/23 1114 through 06/15/23 1114   No notes of this type exist for this encounter.          Consult Notes (last 24 hours)        Saadia Mondragon PA-C at 06/15/23 0801        Consult Orders    1. Inpatient Vascular Surgery Consult [[id number]] ordered by Esteban Cooper DO at 06/14/23 8577                 Cardiothoracic Surgery History & Physical           Chief complaint: left lower extremity wound    HPI:   Ms. Elda Salazar is a 32yo female with PMH of bipolar disorder, substance abuse who presented to the ED yesterday with complaints of worsening left lower extremity wound which started about a week ago due to trauma from shaving her legs.   Currently, the patient reports pain in the left lower extremity surrounding the wound which has not improved since starting antibiotics.     Past Medical History:   Diagnosis Date    Bipolar disorder     Polysubstance abuse      Past Surgical History:   Procedure Laterality Date    TONSILLECTOMY       Family History   Problem Relation Age of Onset    Hypertension Maternal Grandmother      Social History     Tobacco Use    Smoking status: Every Day     Packs/day: 1.00     Types: Cigarettes    Smokeless tobacco: Never   Vaping Use    Vaping Use: Some days    Substances: Nicotine    Devices: Disposable   Substance Use Topics    Alcohol use: Never    Drug use: Not Currently     Types: Fentanyl, Methamphetamines, Benzodiazepines, Cocaine(coke), MDMA (ecstacy), Marijuana, Heroin, Hydrocodone     Comment: reports 1.5 years \"clean\" from " methamphetamine- Femtanyl use few days ago and heroin       Medications Prior to Admission   Medication Sig Dispense Refill Last Dose    ARIPiprazole (ABILIFY) 15 MG tablet Take 1 tablet by mouth Daily.   6/14/2023    busPIRone (BUSPAR) 10 MG tablet Take 1 tablet by mouth 2 (Two) Times a Day.   6/14/2023    FLUoxetine (PROzac) 20 MG capsule Take 1 capsule by mouth Daily.   6/14/2023    hydrOXYzine (ATARAX) 25 MG tablet Take 1 tablet by mouth 3 (Three) Times a Day As Needed for Itching.   6/14/2023    melatonin 5 MG tablet tablet Take 1 tablet by mouth.   Past Week    methadone (DOLOPHINE) 10 MG tablet Take 1 tablet by mouth Daily Pt takes 105 mg daily,   Patient Taking Differently     Allergies:  Patient has no known allergies.    Review of Systems:  A comprehensive review of systems was negative except for:     All other systems were reviewed and were negative.    Vital Signs:  Temp:  [98.4 °F (36.9 °C)-98.7 °F (37.1 °C)] 98.4 °F (36.9 °C)  Heart Rate:  [61-89] 61  Resp:  [17-20] 18  BP: (118-128)/(62-97) 118/78    Physical Exam:  Physical Exam  Vitals and nursing note reviewed.   Constitutional:       Appearance: Normal appearance.   HENT:      Head: Normocephalic and atraumatic.      Mouth/Throat:      Mouth: Mucous membranes are moist.   Eyes:      Pupils: Pupils are equal, round, and reactive to light.   Cardiovascular:      Rate and Rhythm: Normal rate and regular rhythm.      Pulses: Normal pulses.   Pulmonary:      Effort: Pulmonary effort is normal.      Breath sounds: Normal breath sounds.   Abdominal:      General: Abdomen is flat.      Palpations: Abdomen is soft.   Musculoskeletal:      Comments: 1.5cm wound to the posterior aspect of the left calf which tracks down about 1cm with eschar but no gorge drainage. There is surrounding erythema and tenderness   Skin:     General: Skin is warm and dry.      Capillary Refill: Capillary refill takes less than 2 seconds.   Neurological:      Mental Status: She  is alert and oriented to person, place, and time.   Psychiatric:         Mood and Affect: Mood normal.         Behavior: Behavior normal.       Labs:  Results from last 7 days   Lab Units 06/15/23  0441   WBC 10*3/mm3 6.81   HEMOGLOBIN g/dL 12.9   HEMATOCRIT % 37.9   PLATELETS 10*3/mm3 239     Results from last 7 days   Lab Units 06/15/23  0441   SODIUM mmol/L 136   POTASSIUM mmol/L 3.8   CHLORIDE mmol/L 104   CO2 mmol/L 25.0   BUN mg/dL 13   CREATININE mg/dL 0.67   GLUCOSE mg/dL 77   CALCIUM mg/dL 8.2*         Coagulation: No results found for: INR, APTT  Cardiac markers:     ABGs:       Invalid input(s): PO2    Imaging:   CT LOWER EXTREMITY LEFT W CONTRAST     Date of Exam: 6/14/2023 10:31 PM EDT     Indication: Non-healing wound of left calf.     Comparison: None available.     Technique: Axial CT images were obtained of the left lower extremity after the uneventful intravenous administration of 90 mL Isovue 300.  Reconstructed coronal and sagittal images were also obtained. Automated exposure control and iterative construction   methods were used.        Findings:  The osseous structures of the calf are intact, without evidence of fracture or suspicious lytic or sclerotic lesion. Consistent with provided history, there is a small focal area of ulceration noted posteriorly, with some mild underlying soft tissue   thickening present. There is no evidence of focal fluid collection more specifically concerning for abscess and inflammatory change does not definitely extend deep to the adjacent fascia.     IMPRESSION:  Impression:  14 mm ulcerated area along the posterior calf with some mild soft tissue edema consistent with provided history. There is no evidence of focal fluid collection concerning for abscess.           Electronically Signed: Nilesh Werner    6/14/2023 10:54 PM EDT       Assessment:   Patient Active Problem List   Diagnosis    Non-healing ulcer of lower leg, left, with unspecified severity     History of substance abuse    Bipolar disorder, in partial remission, most recent episode mixed    Cellulitis of left lower extremity         Plan:  Continue IV antibiotics   Wound care consult for possible bedside debridement and packing         Saadia Mondragon PA-C  06/15/23  08:01 EDT            Electronically signed by Saadia Mondragon PA-C at 06/15/23 0999

## 2023-06-15 NOTE — ED PROVIDER NOTES
" EMERGENCY DEPARTMENT ENCOUNTER    Pt Name: Elda Salazar  MRN: [id number]  Pt :   1990  Room Number:    Date of encounter:  2023  PCP: Genaro Baker MD  ED Provider: BRAXTON Saucedo    Historian: Patient    HPI:  Chief Complaint: Left Leg Wound    Context: Elda Salazar is a 33 y.o. female who presents to the ED c/o a nonhealing wound to her left lower extremity.  Patient reports that approximately 1 week ago after shaving she noticed a small wound that has significantly grown in size and depth since that time she noticed it.  She states that she has been working on it and it is caused her increased discomfort.  Patient states that she wrapped the wound and noticed that has been draining and smells bad.  Patient has no significant past medical history.  Only prior diagnosis of bipolar disorder.  She denies any additional complaints on interview and exam.  HPI     REVIEW OF SYSTEMS  A chief complaint appropriate review of systems was completed and is negative except as noted in the HPI.     PAST MEDICAL HISTORY  Past Medical History:   Diagnosis Date    Bipolar disorder     Polysubstance abuse        PAST SURGICAL HISTORY  Past Surgical History:   Procedure Laterality Date    TONSILLECTOMY         FAMILY HISTORY  Family History   Problem Relation Age of Onset    Hypertension Maternal Grandmother        SOCIAL HISTORY  Social History     Socioeconomic History    Marital status:    Tobacco Use    Smoking status: Every Day     Packs/day: 1.00     Types: Cigarettes   Substance and Sexual Activity    Alcohol use: Not Currently    Drug use: Not Currently     Comment: reports 1.5 years \"clean\" from methamphetamine and heroin    Sexual activity: Defer       ALLERGIES  Patient has no known allergies.    PHYSICAL EXAM  Physical Exam  Vitals and nursing note reviewed.   Constitutional:       General: She is not in acute distress.     Appearance: Normal appearance. She is not ill-appearing. "   HENT:      Head: Normocephalic and atraumatic.      Nose: Nose normal.      Mouth/Throat:      Mouth: Mucous membranes are moist.   Eyes:      Extraocular Movements: Extraocular movements intact.   Cardiovascular:      Rate and Rhythm: Normal rate.   Pulmonary:      Effort: Pulmonary effort is normal.   Abdominal:      General: There is no distension.   Musculoskeletal:         General: Normal range of motion.      Cervical back: Normal range of motion and neck supple.      Comments: Approximate 2 cm wound to posterior of left calf. ##Please find photo detailing area of specific concern below##   Skin:     General: Skin is warm and dry.   Neurological:      General: No focal deficit present.      Mental Status: She is alert.   Psychiatric:         Mood and Affect: Mood normal.         Behavior: Behavior normal.         LAB RESULTS  Results for orders placed or performed during the hospital encounter of 06/14/23   Comprehensive Metabolic Panel    Specimen: Blood   Result Value Ref Range    Glucose 108 (H) 65 - 99 mg/dL    BUN 15 6 - 20 mg/dL    Creatinine 0.70 0.57 - 1.00 mg/dL    Sodium 140 136 - 145 mmol/L    Potassium 4.0 3.5 - 5.2 mmol/L    Chloride 105 98 - 107 mmol/L    CO2 26.0 22.0 - 29.0 mmol/L    Calcium 8.9 8.6 - 10.5 mg/dL    Total Protein 7.3 6.0 - 8.5 g/dL    Albumin 4.0 3.5 - 5.2 g/dL    ALT (SGPT) 8 1 - 33 U/L    AST (SGOT) 22 1 - 32 U/L    Alkaline Phosphatase 84 39 - 117 U/L    Total Bilirubin 0.2 0.0 - 1.2 mg/dL    Globulin 3.3 gm/dL    A/G Ratio 1.2 g/dL    BUN/Creatinine Ratio 21.4 7.0 - 25.0    Anion Gap 9.0 5.0 - 15.0 mmol/L    eGFR 116.6 >60.0 mL/min/1.73   CBC Auto Differential    Specimen: Blood   Result Value Ref Range    WBC 9.28 3.40 - 10.80 10*3/mm3    RBC 4.64 3.77 - 5.28 10*6/mm3    Hemoglobin 13.3 12.0 - 15.9 g/dL    Hematocrit 40.3 34.0 - 46.6 %    MCV 86.9 79.0 - 97.0 fL    MCH 28.7 26.6 - 33.0 pg    MCHC 33.0 31.5 - 35.7 g/dL    RDW 12.5 12.3 - 15.4 %    RDW-SD 39.6 37.0 - 54.0  fl    MPV 9.0 6.0 - 12.0 fL    Platelets 279 140 - 450 10*3/mm3    Neutrophil % 51.1 42.7 - 76.0 %    Lymphocyte % 37.7 19.6 - 45.3 %    Monocyte % 7.4 5.0 - 12.0 %    Eosinophil % 2.8 0.3 - 6.2 %    Basophil % 0.6 0.0 - 1.5 %    Immature Grans % 0.4 0.0 - 0.5 %    Neutrophils, Absolute 4.73 1.70 - 7.00 10*3/mm3    Lymphocytes, Absolute 3.50 (H) 0.70 - 3.10 10*3/mm3    Monocytes, Absolute 0.69 0.10 - 0.90 10*3/mm3    Eosinophils, Absolute 0.26 0.00 - 0.40 10*3/mm3    Basophils, Absolute 0.06 0.00 - 0.20 10*3/mm3    Immature Grans, Absolute 0.04 0.00 - 0.05 10*3/mm3    nRBC 0.0 0.0 - 0.2 /100 WBC   Lactic Acid, Plasma    Specimen: Blood   Result Value Ref Range    Lactate 0.6 0.5 - 2.0 mmol/L   Sedimentation Rate    Specimen: Blood   Result Value Ref Range    Sed Rate 15 0 - 20 mm/hr   C-reactive Protein    Specimen: Blood   Result Value Ref Range    C-Reactive Protein 0.85 (H) 0.00 - 0.50 mg/dL   Procalcitonin    Specimen: Blood   Result Value Ref Range    Procalcitonin <0.02 0.00 - 0.25 ng/mL   POC Urine Pregnancy    Specimen: Urine   Result Value Ref Range    HCG, Urine, QL Negative Negative    Lot Number [id number]     Internal Positive Control Passed Positive, Passed    Internal Negative Control Passed Negative, Passed    Expiration Date 08/03/2024    Green Top (Gel)   Result Value Ref Range    Extra Tube Hold for add-ons.    Lavender Top   Result Value Ref Range    Extra Tube hold for add-on    Gold Top - SST   Result Value Ref Range    Extra Tube Hold for add-ons.    Gray Top   Result Value Ref Range    Extra Tube Hold for add-ons.    Light Blue Top   Result Value Ref Range    Extra Tube Hold for add-ons.        If labs were ordered, I independently reviewed the results and considered them in treating the patient.    RADIOLOGY  CT Lower Extremity Left With Contrast   Final Result   Impression:   14 mm ulcerated area along the posterior calf with some mild soft tissue edema consistent with provided history. There  is no evidence of focal fluid collection concerning for abscess.            Electronically Signed: Nilesh Werner     6/14/2023 10:54 PM EDT     Workstation ID: [id number]        [x] Radiologist's Report Reviewed:  I ordered and independently reviewed the above noted radiographic studies.  See radiologist's dictation for official interpretation.      PROCEDURES    Procedures    No orders to display       MEDICATIONS GIVEN IN ER    Medications   sodium chloride 0.9 % flush 10 mL (has no administration in time range)   sodium chloride 0.9 % flush 10 mL (has no administration in time range)   vancomycin 1750 mg/500 mL 0.9% NS IVPB ([facility]) (has no administration in time range)   Pharmacy to dose vancomycin (has no administration in time range)   cefTRIAXone (ROCEPHIN) 2 g/100 mL 0.9% NS IVPB ([facility]) (has no administration in time range)   saccharomyces boulardii (FLORASTOR) capsule 250 mg (has no administration in time range)   nicotine (NICODERM CQ) 21 MG/24HR patch 1 patch (1 patch Transdermal Medication Applied 6/15/23 0033)   iopamidol (ISOVUE-300) 61 % injection 100 mL (90 mL Intravenous Given 6/14/23 2236)   piperacillin-tazobactam (ZOSYN) 3.375 g in iso-osmotic dextrose 50 ml (premix) (0 g Intravenous Stopped 6/15/23 0112)       MEDICAL DECISION MAKING, PROGRESS, and CONSULTS   Medical Decision Making  Problems Addressed:  History of bipolar disorder: chronic illness or injury  History of tobacco abuse: chronic illness or injury  Left leg pain: complicated acute illness or injury  Non-healing ulcer of lower leg, left, with unspecified severity: complicated acute illness or injury    Amount and/or Complexity of Data Reviewed  Labs: ordered.  Radiology: ordered.    Risk  Prescription drug management.  Decision regarding hospitalization.        All labs have been independently reviewed by me.  All radiology studies have been reviewed by me and the radiologist dictating the report.  All EKG's have been independently viewed  by me.    [] Discussed with radiology regarding test interpretation:    Discussion below represents my analysis of pertinent findings related to patient's condition, differential diagnosis, treatment plan and final disposition.    Differential diagnosis:  The differential diagnosis associated with the patient's presentation includes: Cellulitis, bug bite reaction, impetigo, drug eruption, ringworm or other fungal infection.     Additional sources  Discussed/ obtained information from independent historians:   [] Spouse  [] Parent  [] Family member  [] Friend  [] EMS   [] Other:  External (non-ED) record review:   [] Inpatient record:   [] Office record:   [] Outpatient record:   [] Prior Outpatient labs:   [] Prior Outpatient radiology:   [] Primary Care record:   [] Outside ED record:   [] Other:   Patient's care impacted by:   [] Diabetes  [] Hypertension  [] Hyperlipidemia  [] Hypothyroidism   [] Coronary Artery Disease   [] COPD   [] Cancer   [] Obesity  [] GERD   [x] Tobacco Abuse   [] Substance Abuse   [] Anxiety   [] Depression   [x] Other: Bipolar disorder  Care significantly affected by Social Determinants of Health (housing and economic circumstances, unemployment)    [] Yes     [x] No   If yes, Patient's care significantly limited by  Social Determinants of Health including:   [] Inadequate housing   [] Low income   [] Alcoholism and drug addiction in family   [] Problems related to primary support group   [] Unemployment   [] Problems related to employment   [] Other Social Determinants of Health:     Shared decision making:  I reviewed workup performed in ED including labs and imaging. Based on findings, recommendation made for admission. Patient is agreeable to plan of care and hospital admission.      Orders placed during this visit:  Orders Placed This Encounter   Procedures    Blood Culture - Blood,    Blood Culture - Blood,    Wound Culture - Wound, Leg, Left    CT Lower Extremity Left With  Contrast    [place] Draw    Comprehensive Metabolic Panel    CBC Auto Differential    Lactic Acid, Plasma    Urinalysis With Microscopic If Indicated (No Culture) - Urine, Clean Catch    Sedimentation Rate    C-reactive Protein    Procalcitonin    Urine Drug Screen - Urine, Clean Catch    Fentanyl, Urine - Urine, Clean Catch    Please call [facility] in am to obtain home med list  Nursing Communication    Code Status and Medical Interventions:    Inpatient Vascular Surgery Consult    POC Urine Pregnancy    Wound Ostomy Eval & Treat    Insert peripheral IV    Insert Peripheral IV    Inpatient Admission    ED Bed Request    CBC & Differential    Green Top (Gel)    Lavender Top    Gold Top - SST    Gray Top    Light Blue Top         ED Course:    ED Course as of 06/15/23 0118   Wed Jun 14, 2023 2121 I have consulted with Dr. Bailey who recommended a call to Dr. Carroll with vascular surgery.  [JG]   2123 I have consulted with Vascular Surgery, Dr. Carroll who was unfortunately unable see wound image as he did not have access to Epic. He is happy to follow patient on consult.  [JG]   2316 Hospitalist messaged for admission.  [JG]   2323 Hospitalist agreeable to accept patient for admission [JG]   Thu David 15, 2023   0111 In summary this is a 33 year old female with history of Bipolar disorder who presents to the ED with a non-healing wound to her left lower extremity. Denies any recent IV drug use or injuries. Labs obtained and reviewed demonstrate no acute or emergent abnormalities.  CT lower extremity demonstrates 14 mm ulcerated area along the posterior calf with some mild soft tissue edema and no evidence of focal fluid collection concerning for abscess. Blood and wound cultures obtained and patient initiated on broad spectrum antibiotics. Vascular surgery consulted as directed by Dr. Bailey who will see patient in consult. Patient admitted to hospitalist service for further evaluation and treatment. At  time of admission disposition patient is afebrile, nontoxic appearing, vital signs stable and able to maintain O2 sats of 97% on room air.  [JG]      ED Course User Index  [JG] Khari Neff PA            DIAGNOSIS  Final diagnoses:   Non-healing ulcer of lower leg, left, with unspecified severity   Left leg pain   History of bipolar disorder   History of tobacco abuse       DISPOSITION    ED Disposition       ED Disposition   Decision to Admit    Condition   --    Comment   Level of Care: Telemetry [5]   Diagnosis: Non-healing ulcer of lower leg, left, with unspecified severity [[id number]]   Admitting Physician: AGUSTO FLOR [[id number]]   Attending Physician: AGUSTO FLOR [[id number]]                 Please note that portions of this document were completed with voice recognition software.        Khari Neff PA  06/15/23 0118

## 2023-06-15 NOTE — SIGNIFICANT NOTE
Pt positive for fentanyl.  Will add narcan as continues to have some somnolence.  Advised nurse to give if needed for oversedation/ respirations

## 2023-06-15 NOTE — PLAN OF CARE
"Goal Outcome Evaluation:  Plan of Care Reviewed With: patient        Progress: no change  Outcome Evaluation: PT wound care initial evaluation complete. Pt presents with L posterior calf ulceration with considerable loose necrotic slough at wound base along with partial dry, black eschar cap at superficial wound. Pt with mild/moderate periwound erythema.  PT able to debride moderate amounts of nonviable tissue from wound although further debridement limited d/t pt complaints of pain. PT applied antibacterial dressings to help reduce bacterial load and promote optimal wound healing environment. PT packed wound with saline moist HFBc and covered with 4\" optifoam, dressing may remain in place for 2-3 days pending saturation of drainage. Pt would benefit from outpatient  wound care follow up for further debridement and advanced dressing management.      "

## 2023-06-15 NOTE — PROGRESS NOTES
[facility] Medicine Services  PROGRESS NOTE    Patient Name: Elda Salazar  : 1990  MRN: [id number]    Date of Admission: 2023  Primary Care Physician: Genaro Baker MD    Subjective   Subjective     CC:  Wound leg    HPI:  Patient reports wound started as abscess, then worsened w epson salt treatments  Reports methadone use outpatient but also heroin w last use 4 days ago    ROS:  Gen- No fevers, chills  CV- No chest pain, palpitations  Resp- No cough, dyspnea    Objective   Objective     Vital Signs:   Temp:  [98.4 °F (36.9 °C)-98.7 °F (37.1 °C)] 98.4 °F (36.9 °C)  Heart Rate:  [61-89] 66  Resp:  [17-20] 18  BP: (113-128)/(62-97) 113/81     Physical Exam:  Constitutional: No acute distress, awake, alert female lying in bed  HENT: NCAT, mucous membranes moist  Respiratory: Clear to auscultation bilaterally, respiratory effort normal   Cardiovascular: RRR, no murmurs, rubs, or gallops  Gastrointestinal: Soft, nontender, nondistended  Musculoskeletal: LLE ulcer to posterior calf w some eschar, erythema surrounding is minimal (appears improved from prior per note). Muscle tone within normal limits, no joint effusions appreciated  Psychiatric: Appropriate affect, cooperative  Neurologic: Alert and oriented, facial movements symmetric and spontaneous movement of all 4 extremities grossly equal bilaterally, speech clear  Skin: No rashes    Results Reviewed:  LAB RESULTS:      Lab 06/15/23  0441 06/15/23  0034 23   WBC 6.81  --  9.28   HEMOGLOBIN 12.9  --  13.3   HEMATOCRIT 37.9  --  40.3   PLATELETS 239  --  279   NEUTROS ABS  --   --  4.73   IMMATURE GRANS (ABS)  --   --  0.04   LYMPHS ABS  --   --  3.50*   MONOS ABS  --   --  0.69   EOS ABS  --   --  0.26   MCV 86.7  --  86.9   SED RATE  --  15  --    CRP  --   --  0.85*   PROCALCITONIN  --   --  <0.02   LACTATE  --   --  0.6         Lab 06/15/23  0441 06/14/23  2039   SODIUM 136 140   POTASSIUM 3.8 4.0    CHLORIDE 104 105   CO2 25.0 26.0   ANION GAP 7.0 9.0   BUN 13 15   CREATININE 0.67 0.70   EGFR 117.8 116.6   GLUCOSE 77 108*   CALCIUM 8.2* 8.9         Lab 06/15/23  0441 06/14/23 2039   TOTAL PROTEIN 6.4 7.3   ALBUMIN 3.5 4.0   GLOBULIN 2.9 3.3   ALT (SGPT) 5 8   AST (SGOT) 21 22   BILIRUBIN 0.3 0.2   ALK PHOS 76 84                     Brief Urine Lab Results  (Last result in the past 365 days)        Color   Clarity   Blood   Leuk Est   Nitrite   Protein   CREAT   Urine HCG        06/15/23 0045 Yellow   Clear   Trace   Negative   Negative   Negative                   Microbiology Results Abnormal       Procedure Component Value - Date/Time    Wound Culture - Wound, Leg, Left [[id number]] Collected: 06/14/23 2140    Lab Status: Preliminary result Specimen: Wound from Leg, Left Updated: 06/15/23 0647     Gram Stain Rare (1+) WBCs seen      No organisms seen            CT Lower Extremity Left With Contrast    Result Date: 6/14/2023  CT LOWER EXTREMITY LEFT W CONTRAST Date of Exam: 6/14/2023 10:31 PM EDT Indication: Non-healing wound of left calf. Comparison: None available. Technique: Axial CT images were obtained of the left lower extremity after the uneventful intravenous administration of 90 mL Isovue 300.  Reconstructed coronal and sagittal images were also obtained. Automated exposure control and iterative construction  methods were used. Findings: The osseous structures of the calf are intact, without evidence of fracture or suspicious lytic or sclerotic lesion. Consistent with provided history, there is a small focal area of ulceration noted posteriorly, with some mild underlying soft tissue thickening present. There is no evidence of focal fluid collection more specifically concerning for abscess and inflammatory change does not definitely extend deep to the adjacent fascia.     Impression: Impression: 14 mm ulcerated area along the posterior calf with some mild soft tissue edema consistent with provided  history. There is no evidence of focal fluid collection concerning for abscess. Electronically Signed: Nilesh Werner  6/14/2023 10:54 PM EDT  Workstation ID: [id number]         Current medications:  Scheduled Meds:cefTRIAXone, 2 g, Intravenous, Q24H  methadone, 80 mg, Oral, Daily  nicotine, 1 patch, Transdermal, Q24H  saccharomyces boulardii, 250 mg, Oral, BID  senna-docusate sodium, 2 tablet, Oral, BID  sodium chloride, 10 mL, Intravenous, Q12H  vancomycin, 1,250 mg, Intravenous, Q12H      Continuous Infusions:Pharmacy to dose vancomycin,       PRN Meds:.  acetaminophen    senna-docusate sodium **AND** polyethylene glycol **AND** bisacodyl **AND** bisacodyl    melatonin    naloxone    ondansetron    Pharmacy to dose vancomycin    sodium chloride    [COMPLETED] Insert Peripheral IV **AND** sodium chloride    sodium chloride    sodium chloride    Assessment & Plan   Assessment & Plan     Active Hospital Problems    Diagnosis  POA    **Non-healing ulcer of lower leg, left, with unspecified severity [L97.929]  Yes    History of substance abuse [F19.11]  Unknown    Bipolar disorder, in partial remission, most recent episode mixed [F31.77]  Unknown    Cellulitis of left lower extremity [L03.116]  Unknown      Resolved Hospital Problems   No resolved problems to display.        Brief Hospital Course to date:  Elda Salazar is a 33 y.o. female w IVDU/methadone use who presents w 1 week of LLE wound, nonhealing    LLE Ulcerative Wound C/b Cellulitis  -high risk for MRSA: ok to continue vanc + ceftriaxone given improvement and relatively minor nature  -PT wound care to see  -likely discharge soon w bactrim and close f/u: no sepsis, no prior abx treatment, etc    Fentanyl + UDS, chronic methadone use  -confirmed methadone dose of 105 mg w clinic, last dose 6/13 and in good standing w clinic  -fentanyl +, patient reports heroin use 4 days ago  -not somnolent on exam, high risk of withdrawal and confirmed methadone f/u. Decrease  home dose by 20% and restart for maintenance    Bipolar disorder - home meds  Tobacco use - counseling    Expected Discharge Location and Transportation: home  Expected Discharge 6/16  Expected Discharge Date: 6/16/2023; Expected Discharge Time:      DVT prophylaxis:  Mechanical DVT prophylaxis orders are present.          CODE STATUS:   Code Status and Medical Interventions:   Ordered at: 06/14/23 2330     Level Of Support Discussed With:    Patient     Code Status (Patient has no pulse and is not breathing):    CPR (Attempt to Resuscitate)     Medical Interventions (Patient has pulse or is breathing):    Full Support       Marilyn Marsh MD  06/15/23

## 2023-06-15 NOTE — CONSULTS
"Cardiothoracic Surgery History & Physical           Chief complaint: left lower extremity wound    HPI:   Ms. Elda Salazar is a 32yo female with PMH of bipolar disorder, substance abuse who presented to the ED yesterday with complaints of worsening left lower extremity wound which started about a week ago due to trauma from shaving her legs.   Currently, the patient reports pain in the left lower extremity surrounding the wound which has not improved since starting antibiotics.     Past Medical History:   Diagnosis Date    Bipolar disorder     Polysubstance abuse      Past Surgical History:   Procedure Laterality Date    TONSILLECTOMY       Family History   Problem Relation Age of Onset    Hypertension Maternal Grandmother      Social History     Tobacco Use    Smoking status: Every Day     Packs/day: 1.00     Types: Cigarettes    Smokeless tobacco: Never   Vaping Use    Vaping Use: Some days    Substances: Nicotine    Devices: Disposable   Substance Use Topics    Alcohol use: Never    Drug use: Not Currently     Types: Fentanyl, Methamphetamines, Benzodiazepines, Cocaine(coke), MDMA (ecstacy), Marijuana, Heroin, Hydrocodone     Comment: reports 1.5 years \"clean\" from methamphetamine- Femtanyl use few days ago and heroin       Medications Prior to Admission   Medication Sig Dispense Refill Last Dose    ARIPiprazole (ABILIFY) 15 MG tablet Take 1 tablet by mouth Daily.   6/14/2023    busPIRone (BUSPAR) 10 MG tablet Take 1 tablet by mouth 2 (Two) Times a Day.   6/14/2023    FLUoxetine (PROzac) 20 MG capsule Take 1 capsule by mouth Daily.   6/14/2023    hydrOXYzine (ATARAX) 25 MG tablet Take 1 tablet by mouth 3 (Three) Times a Day As Needed for Itching.   6/14/2023    melatonin 5 MG tablet tablet Take 1 tablet by mouth.   Past Week    methadone (DOLOPHINE) 10 MG tablet Take 1 tablet by mouth Daily Pt takes 105 mg daily,   Patient Taking Differently     Allergies:  Patient has no known allergies.    Review of Systems:  A " comprehensive review of systems was negative except for:     All other systems were reviewed and were negative.    Vital Signs:  Temp:  [98.4 °F (36.9 °C)-98.7 °F (37.1 °C)] 98.4 °F (36.9 °C)  Heart Rate:  [61-89] 61  Resp:  [17-20] 18  BP: (118-128)/(62-97) 118/78    Physical Exam:  Physical Exam  Vitals and nursing note reviewed.   Constitutional:       Appearance: Normal appearance.   HENT:      Head: Normocephalic and atraumatic.      Mouth/Throat:      Mouth: Mucous membranes are moist.   Eyes:      Pupils: Pupils are equal, round, and reactive to light.   Cardiovascular:      Rate and Rhythm: Normal rate and regular rhythm.      Pulses: Normal pulses.   Pulmonary:      Effort: Pulmonary effort is normal.      Breath sounds: Normal breath sounds.   Abdominal:      General: Abdomen is flat.      Palpations: Abdomen is soft.   Musculoskeletal:      Comments: 1.5cm wound to the posterior aspect of the left calf which tracks down about 1cm with eschar but no gorge drainage. There is surrounding erythema and tenderness   Skin:     General: Skin is warm and dry.      Capillary Refill: Capillary refill takes less than 2 seconds.   Neurological:      Mental Status: She is alert and oriented to person, place, and time.   Psychiatric:         Mood and Affect: Mood normal.         Behavior: Behavior normal.       Labs:  Results from last 7 days   Lab Units 06/15/23  0441   WBC 10*3/mm3 6.81   HEMOGLOBIN g/dL 12.9   HEMATOCRIT % 37.9   PLATELETS 10*3/mm3 239     Results from last 7 days   Lab Units 06/15/23  0441   SODIUM mmol/L 136   POTASSIUM mmol/L 3.8   CHLORIDE mmol/L 104   CO2 mmol/L 25.0   BUN mg/dL 13   CREATININE mg/dL 0.67   GLUCOSE mg/dL 77   CALCIUM mg/dL 8.2*         Coagulation: No results found for: INR, APTT  Cardiac markers:     ABGs:       Invalid input(s): PO2    Imaging:   CT LOWER EXTREMITY LEFT W CONTRAST     Date of Exam: 6/14/2023 10:31 PM EDT     Indication: Non-healing wound of left calf.      Comparison: None available.     Technique: Axial CT images were obtained of the left lower extremity after the uneventful intravenous administration of 90 mL Isovue 300.  Reconstructed coronal and sagittal images were also obtained. Automated exposure control and iterative construction   methods were used.        Findings:  The osseous structures of the calf are intact, without evidence of fracture or suspicious lytic or sclerotic lesion. Consistent with provided history, there is a small focal area of ulceration noted posteriorly, with some mild underlying soft tissue   thickening present. There is no evidence of focal fluid collection more specifically concerning for abscess and inflammatory change does not definitely extend deep to the adjacent fascia.     IMPRESSION:  Impression:  14 mm ulcerated area along the posterior calf with some mild soft tissue edema consistent with provided history. There is no evidence of focal fluid collection concerning for abscess.           Electronically Signed: Nilesh Werner    6/14/2023 10:54 PM EDT       Assessment:   Patient Active Problem List   Diagnosis    Non-healing ulcer of lower leg, left, with unspecified severity    History of substance abuse    Bipolar disorder, in partial remission, most recent episode mixed    Cellulitis of left lower extremity         Plan:  Continue IV antibiotics   Wound care consult for possible bedside debridement and packing         Saadia Mondragon PA-C  06/15/23  08:01 EDT

## 2023-06-15 NOTE — THERAPY EVALUATION
Acute Care - Wound/Debridement Initial Evaluation  [facility]     Patient Name: Elda Salazar  : 1990  MRN: [id number]  Today's Date: 6/15/2023                Admit Date: 2023    Visit Dx:    ICD-10-CM ICD-9-CM   1. Non-healing ulcer of lower leg, left, with unspecified severity  L97.929 707.19   2. Left leg pain  M79.605 729.5   3. History of bipolar disorder  Z86.59 V11.1   4. History of tobacco abuse  Z87.891 V15.82      L posterior calf (pre-debridement)      L posterior calf (post-debridement)        Patient Active Problem List   Diagnosis   • Non-healing ulcer of lower leg, left, with unspecified severity   • History of substance abuse   • Bipolar disorder, in partial remission, most recent episode mixed   • Cellulitis of left lower extremity        Past Medical History:   Diagnosis Date   • Bipolar disorder    • Polysubstance abuse         Past Surgical History:   Procedure Laterality Date   • TONSILLECTOMY             Wound 06/15/23 135 Left posterior calf Soft Tissue Necrosis (Active)   Dressing Appearance open to air 06/15/23 135   Base moist;necrotic;yellow;slough;subcutaneous;black eschar;pink;red;other (see comments) 06/15/23 135   Black (%), Wound Tissue Color 15 06/15/23 135   Red (%), Wound Tissue Color 15 06/15/23 135   Yellow (%), Wound Tissue Color 70 06/15/23 135   Periwound intact;dry;swelling;warm;blanchable;redness 06/15/23 135   Periwound Temperature warm 06/15/23 1355   Periwound Skin Turgor soft 06/15/23 135   Edges open 06/15/23 135   Wound Length (cm) 2.1 cm 06/15/23 135   Wound Width (cm) 2.2 cm 06/15/23 135   Wound Depth (cm) 1.1 cm 06/15/23 135   Wound Surface Area (cm^2) 4.62 cm^2 06/15/23 135   Wound Volume (cm^3) 5.082 cm^3 06/15/23 135   Drainage Characteristics/Odor yellow;tan;creamy 06/15/23 1355   Drainage Amount small 06/15/23 135   Care, Wound irrigated with;sterile normal saline;debrided 06/15/23 135   Dressing Care dressing applied;antimicrobial  agent applied;foam;low-adherent;border dressing 06/15/23 1355   Periwound Care cleansed with pH balanced cleanser;dry periwound area maintained;other (see comments) 06/15/23 1355         WOUND DEBRIDEMENT  Total area of Debridement: 4cm2  Debridement Site 1  Location- Site 1: L posterior calf  Selective Debridement- Site 1: Wound Surface <20cmsq  Instruments- Site 1: tweezers, scissors, #15, scapel  Excised Tissue Description- Site 1: moderate, slough, eschar, necrotic, adipose  Bleeding- Site 1: minimum, held pressure, 1 minute               PT Assessment (last 12 hours)     PT Evaluation and Treatment     Row Name 06/15/23 8106          Physical Therapy Time and Intention    Subjective Information complains of;pain  -     Document Type evaluation;wound care  -     Mode of Treatment physical therapy;individual therapy  -     Row Name 06/15/23 3275          General Information    Patient Profile Reviewed yes  -     Patient Observations alert;cooperative;agree to therapy  -     Pertinent History of Current Functional Problem Pt reports she cut herself shaving approximately 1 week ago. Pt reports she has been using epsom salt, ace wraps, and antibacterial soap although the wound continues to worsen.  -     Risks Reviewed patient:;increased discomfort  -     Benefits Reviewed patient:;increase knowledge;improve skin integrity  -     Barriers to Rehab none identified  -     Row Name 06/15/23 1353          Pain    Pretreatment Pain Rating 5/10  -     Posttreatment Pain Rating 7/10  -     Pain Location - Side/Orientation Left  -     Pain Location lower  -     Pain Location - extremity  posterior calf  -     Pain Intervention(s) Medication (See MAR);Repositioned  -     Row Name 06/15/23 6370          Cognition    Affect/Mental Status (Cognition) WFL  -     Orientation Status (Cognition) oriented x 4  -     Row Name 06/15/23 1355          Wound 06/15/23 1355 Left posterior calf Soft Tissue  "Necrosis    Wound - Properties Group Placement Date: 06/15/23  - Placement Time: 1355  -LH Present on Hospital Admission: Y  -LH Side: Left  -LH Orientation: posterior  -LH Location: calf  -LH Primary Wound Type: Soft tissue  -LH    Dressing Appearance open to air  -LH     Base moist;necrotic;yellow;slough;subcutaneous;black eschar;pink;red;other (see comments)  Loose slough at wound base with partial outer eschar cap. Minimal viable tissue visible at wound base.  -LH     Black (%), Wound Tissue Color 15  -LH     Red (%), Wound Tissue Color 15  -LH     Yellow (%), Wound Tissue Color 70  -LH     Periwound intact;dry;swelling;warm;blanchable;redness  -LH     Periwound Temperature warm  -LH     Periwound Skin Turgor soft  -LH     Edges open  -LH     Wound Length (cm) 2.1 cm  -LH     Wound Width (cm) 2.2 cm  -LH     Wound Depth (cm) 1.1 cm  -LH     Wound Surface Area (cm^2) 4.62 cm^2  -     Wound Volume (cm^3) 5.082 cm^3  -LH     Drainage Characteristics/Odor yellow;tan;creamy  -     Drainage Amount small  -LH     Care, Wound irrigated with;sterile normal saline;debrided  -LH     Dressing Care dressing applied;antimicrobial agent applied;foam;low-adherent;border dressing  saline moist HFBc to pack, 4\" optifoam to cover  -LH     Periwound Care cleansed with pH balanced cleanser;dry periwound area maintained;other (see comments)  NoSting  -LH     Retired Wound - Properties Group Placement Date: 06/15/23  - Placement Time: 1355  -LH Present on Hospital Admission: Y  -LH Side: Left  -LH Orientation: posterior  -LH Location: calf  -LH Primary Wound Type: Soft tissue  -LH    Retired Wound - Properties Group Date first assessed: 06/15/23  - Time first assessed: 1355  -LH Present on Hospital Admission: Y  -LH Side: Left  -LH Location: calf  -LH Primary Wound Type: Soft tissue  -LH    Row Name 06/15/23 1355          Coping    Observed Emotional State calm;cooperative  -LH     Verbalized Emotional State fear  -     " "Trust Relationship/Rapport care explained;questions answered  -     Row Name 06/15/23 4107          Plan of Care Review    Plan of Care Reviewed With patient  -     Progress no change  -     Outcome Evaluation PT wound care initial evaluation complete. Pt presents with L posterior calf ulceration with considerable loose necrotic slough at wound base along with partial dry, black eschar cap at superficial wound. Pt with mild/moderate periwound erythema.  PT able to debride moderate amounts of nonviable tissue from wound although further debridement limited d/t pt complaints of pain. PT applied antibacterial dressings to help reduce bacterial load and promote optimal wound healing environment. PT packed wound with saline moist HFBc and covered with 4\" optifoam, dressing may remain in place for 2-3 days pending saturation of drainage. Pt would benefit from outpatient  wound care follow up for further debridement and advanced dressing management.  -     Row Name 06/15/23 8960          Positioning and Restraints    Pre-Treatment Position in bed  -     Post Treatment Position bed  -     In Bed supine;call light within reach;encouraged to call for assist  -     Row Name 06/15/23 4025          Therapy Assessment/Plan (PT)    Patient/Family Therapy Goals Statement (PT) Wound healing, pain management.  -     PT Diagnosis (PT) L posterior calf ulcer with necrosis.  -     Rehab Potential (PT) good, to achieve stated therapy goals  -     Criteria for Skilled Interventions Met (PT) yes;meets criteria;skilled treatment is necessary  -     Row Name 06/15/23 8074          PT Evaluation Complexity    History, PT Evaluation Complexity 1-2 personal factors and/or comorbidities  -     Examination of Body Systems (PT Eval Complexity) 1-2 elements  -     Clinical Presentation (PT Evaluation Complexity) evolving  -     Clinical Decision Making (PT Evaluation Complexity) moderate complexity  -     Overall " "Complexity (PT Evaluation Complexity) low complexity  -     Row Name 06/15/23 1355          Therapy Plan Review/Discharge Plan (PT)    Therapy Plan Review (PT) evaluation/treatment results reviewed;care plan/treatment goals reviewed;risks/benefits reviewed;current/potential barriers reviewed;participants voiced agreement with care plan;participants included;patient  -     Row Name 06/15/23 1355          Physical Therapy Goals    Wound Care Goal Selection (PT) wound care, PT goal 1;wound care, PT goal 2  -     Row Name 06/15/23 1355          Wound Care Goal 1 (PT)    Wound Care Goal 1 (PT) Demonstrate >75% granulation of wound base to promote optimal wound healing potential.  -     Time Frame (Wound Care Goal 1, PT) long term goal (LTG);10 days  -     Row Name 06/15/23 1355          Wound Care Goal 2 (PT)    Wound Care Goal 2 (PT) Decrease depth of wound by 0.5cm as evidence of wound healing.  -     Time Frame (Wound Care Goal 2, PT) long term goal (LTG);10 days  -           User Key  (r) = Recorded By, (t) = Taken By, (c) = Cosigned By    Initials Name Provider Type     Esteban Moulton, PT Physical Therapist                  Recommendation and Plan  Planned Therapy Interventions (PT): wound care, patient/family education  Plan of Care Reviewed With: patient   Progress: no change       Progress: no change  Outcome Evaluation: PT wound care initial evaluation complete. Pt presents with L posterior calf ulceration with considerable loose necrotic slough at wound base along with partial dry, black eschar cap at superficial wound. Pt with mild/moderate periwound erythema.  PT able to debride moderate amounts of nonviable tissue from wound although further debridement limited d/t pt complaints of pain. PT applied antibacterial dressings to help reduce bacterial load and promote optimal wound healing environment. PT packed wound with saline moist HFBc and covered with 4\" optifoam, dressing may remain in " place for 2-3 days pending saturation of drainage. Pt would benefit from outpatient  wound care follow up for further debridement and advanced dressing management.  Plan of Care Reviewed With: patient            Time Calculation   PT Charges     Row Name 06/15/23 1506             Time Calculation    Start Time 1355  -LH      PT Goal Re-Cert Due Date 06/25/23  -         Untimed Charges    PT Eval/Re-eval Minutes 40  -LH      Wound Care 64944 Selective debridement  -      [id number] debridement 25  -LH         Total Minutes    Untimed Charges Total Minutes 65  -LH       Total Minutes 65  -LH            User Key  (r) = Recorded By, (t) = Taken By, (c) = Cosigned By    Initials Name Provider Type     Esteban Moulton, PT Physical Therapist                  Therapy Charges for Today     Code Description Service Date Service Provider Modifiers Qty    67487540915 HC PT EVAL LOW COMPLEXITY 3 6/15/2023 Esteban Moulton, PT GP 1    78887066110 HC AN DEBRIDE OPEN WOUND UP TO 20CM 6/15/2023 Esteban Moulton, PT GP 1                    Esteban Moulton PT  6/15/2023

## 2023-06-15 NOTE — PROGRESS NOTES
"Pharmacy Consult-Vancomycin Dosing  Elda Salazar is a  33 y.o. female receiving vancomycin therapy.     Indication:SSTI  Consulting Provider: hospitalist  ID Consult: not at this time    Goal AUC: 400 - 600 mg/L*hr    Current Antimicrobial Therapy  Anti-Infectives (From admission, onward)      Ordered     Dose/Rate Route Frequency Start Stop    06/15/23 0209  vancomycin 1250 mg/250 mL 0.9% NS IVPB ([facility])        Ordering Provider: Ky Rosario, PharmD    1,250 mg  over 75 Minutes Intravenous Every 12 Hours 06/15/23 1200 06/20/23 1159    06/14/23 2323  cefTRIAXone (ROCEPHIN) 2 g/100 mL 0.9% NS IVPB ([facility])        Ordering Provider: Esteban Cooper DO    2 g  over 30 Minutes Intravenous Every 24 Hours 06/15/23 0800 06/20/23 0759    06/14/23 2313  vancomycin 1750 mg/500 mL 0.9% NS IVPB ([facility])        Ordering Provider: Khari Neff PA    20 mg/kg × 90.7 kg Intravenous Once 06/14/23 2329 06/15/23 0123    06/14/23 2313  piperacillin-tazobactam (ZOSYN) 3.375 g in iso-osmotic dextrose 50 ml (premix)        Ordering Provider: Khari Neff PA    3.375 g Intravenous Once 06/14/23 2329 06/15/23 0112    06/14/23 2323  Pharmacy to dose vancomycin        Ordering Provider: Esteban Cooper DO     Does not apply Continuous PRN 06/14/23 2322 06/21/23 2321            Allergies  Allergies as of 06/14/2023    (No Known Allergies)       Labs    Results from last 7 days   Lab Units 06/14/23 2039   BUN mg/dL 15   CREATININE mg/dL 0.70       Results from last 7 days   Lab Units 06/14/23 2039   WBC 10*3/mm3 9.28       Evaluation of Dosing     Last Dose Received in the ED/Outside Facility: 6/15 @ 0123  Is Patient on Dialysis or Renal Replacement: no    Ht - 154.9 cm (61\")  Wt - 90.7 kg (200 lb)    Estimated Creatinine Clearance: 116.2 mL/min (by C-G formula based on SCr of 0.7 mg/dL).    Intake & Output (last 3 days)         06/12 0701 06/13 0700 06/13 0701 06/14 0700 06/14 0701  06/15 0700    IV Piggyback   50    Total Intake(mL/kg)  "  50 (0.6)    Net   +50                   Microbiology and Radiology  Microbiology Results (last 10 days)       ** No results found for the last 240 hours. **            Reported Vancomycin Levels                         [employer] AUC Calculation:    Current AUC:    mg/L*hr    Predicted Steady State AUC on Current Dose: 462 mg/L*hr  _________________________________    Predicted Steady State AUC on New Dose:    mg/L*hr    Assessment/Plan:  Patient received loading dose of 1750mg x 1 in the ER. Will start on 1250mg every 12 hours.  Monitor cultures and signs and symptoms of toxicity.  Will draw a random level with am labs on 6/16.     Ky Rosario, PharmD  6/15/2023  02:13 EDT

## 2023-06-15 NOTE — PLAN OF CARE
"Goal Outcome Evaluation:      Pt is A&O. She is able to ambulate ad kourtney. VSS on RA. She is accompanied by her . Pt is experiencing pain and requested Tylenol. I updated med list, which included 105 mg of Methadone. Pt is a former IV drug user. When asked if pt is still using she stated, \"I try not to\" Admits to using Fentanyl a few days ago. Said that this leg wound was not from her using drugs. Wound consult for tomorrow. I cleaned and dressed the wound for now.   Rinsed wound w/sterile water before cleaning w/ iodine. Xeroform cut to fit on top of wound, covered in kerlix and then Coban.                "

## 2023-06-16 VITALS
BODY MASS INDEX: 37.08 KG/M2 | RESPIRATION RATE: 18 BRPM | SYSTOLIC BLOOD PRESSURE: 120 MMHG | OXYGEN SATURATION: 98 % | HEIGHT: 61 IN | HEART RATE: 60 BPM | TEMPERATURE: 97.8 F | WEIGHT: 196.4 LBS | DIASTOLIC BLOOD PRESSURE: 82 MMHG

## 2023-06-16 PROBLEM — T40.415A: Status: ACTIVE | Noted: 2023-06-16

## 2023-06-16 PROBLEM — L03.116 CELLULITIS OF LEFT LOWER EXTREMITY: Status: RESOLVED | Noted: 2023-06-14 | Resolved: 2023-06-16

## 2023-06-16 LAB — VANCOMYCIN SERPL-MCNC: 21.9 MCG/ML (ref 5–40)

## 2023-06-16 PROCEDURE — 80202 ASSAY OF VANCOMYCIN: CPT

## 2023-06-16 PROCEDURE — 25010000002 VANCOMYCIN 10 G RECONSTITUTED SOLUTION

## 2023-06-16 PROCEDURE — 25010000002 CEFTRIAXONE PER 250 MG: Performed by: INTERNAL MEDICINE

## 2023-06-16 RX ORDER — SULFAMETHOXAZOLE AND TRIMETHOPRIM 800; 160 MG/1; MG/1
1 TABLET ORAL 2 TIMES DAILY
Qty: 10 TABLET | Refills: 0 | Status: SHIPPED | OUTPATIENT
Start: 2023-06-16 | End: 2023-06-17 | Stop reason: HOSPADM

## 2023-06-16 RX ADMIN — SODIUM CHLORIDE 2 G: 900 INJECTION INTRAVENOUS at 08:34

## 2023-06-16 RX ADMIN — METHADONE HYDROCHLORIDE 80 MG: 10 TABLET ORAL at 08:34

## 2023-06-16 RX ADMIN — Medication 10 ML: at 04:53

## 2023-06-16 RX ADMIN — VANCOMYCIN HYDROCHLORIDE 1250 MG: 10 INJECTION, POWDER, LYOPHILIZED, FOR SOLUTION INTRAVENOUS at 01:19

## 2023-06-16 RX ADMIN — SENNOSIDES AND DOCUSATE SODIUM 2 TABLET: 50; 8.6 TABLET ORAL at 08:34

## 2023-06-16 RX ADMIN — Medication 250 MG: at 08:34

## 2023-06-16 NOTE — DISCHARGE SUMMARY
[facility] Medicine Services  DISCHARGE SUMMARY    Patient Name: Elda Salazar  : 1990  MRN: [id number]    Date of Admission: 2023  8:57 PM  Date of Discharge:   Primary Care Physician: Genaro Baker MD    Consults       Date and Time Order Name Status Description    2023 11:58 PM Inpatient Vascular Surgery Consult Completed             Hospital Course     Presenting Problem: LLE wound    Active Hospital Problems    Diagnosis  POA    **Non-healing ulcer of lower leg, left, with unspecified severity [L97.929]  Yes    Fentanyl adverse reaction [T40.415A]  Yes    History of substance abuse [F19.11]  Yes    Bipolar disorder, in partial remission, most recent episode mixed [F31.77]  Yes      Resolved Hospital Problems    Diagnosis Date Resolved POA    Cellulitis of left lower extremity [L03.116] 2023 Yes          Hospital Course:  Elda Salazar is a 33 y.o. female w IVDU on methadone who presents with left leg wound w associated cellulitis. No prior treatment or antibiotics.  Wound superficial, debrided with PT wound care who will follow her outpatient. Cellulitis quickly resolved w antibiotics, discharge with bactrim to complete 7 day course for this. Patient never septic or systemically ill from this relatively minor wound.  Sedated on arrival, UDS positive for both fentanyl and methadone. D/w patient high risk of concomittant use. Defer to methadone clinic where she does report she is UDS checked    Addendum  - wound culture w strep intermedius + citrobacter susceptible to ceftriaxone for both. D/w patient by phone - stop bactrim, start cefdinir for coverage per susceptibilities. Patient understands and will  today - reports doing well.    Discharge Follow Up Recommendations for outpatient labs/diagnostics:  F/u PT wound care  F/u PCP next week as scheduled. Given return precautions    Day of Discharge     HPI:   Feels well, anxious for home today.  Knows how to do her wound care    Review of Systems  Gen- No fevers, chills  CV- No chest pain, palpitations  Resp- No cough, dyspnea  GI- No N/V/D, abd pain    Vital Signs:   Temp:  [97.8 °F (36.6 °C)-98.3 °F (36.8 °C)] 97.8 °F (36.6 °C)  Heart Rate:  [55-72] 60  Resp:  [18] 18  BP: (108-120)/(70-94) 120/82      Physical Exam:  Constitutional: No acute distress, awake, alert  HENT: NCAT, mucous membranes moist  Respiratory: Clear to auscultation bilaterally, respiratory effort normal   Cardiovascular: RRR, no murmurs, rubs, or gallops  Gastrointestinal: Soft, nontender, nondistended  Musculoskeletal: Muscle tone within normal limits, no joint effusions appreciated  Psychiatric: Appropriate affect, cooperative  Neurologic: Alert and oriented, facial movements symmetric and spontaneous movement of all 4 extremities grossly equal bilaterally, speech clear  Skin: Bandage removed and wound examined - pink granulated tissue w packing in place and some drainage. No surrounding celluitis now (improved from yesterday)    Pertinent  and/or Most Recent Results     LAB RESULTS:      Lab 06/15/23  0441 06/15/23  0034 06/14/23 2039   WBC 6.81  --  9.28   HEMOGLOBIN 12.9  --  13.3   HEMATOCRIT 37.9  --  40.3   PLATELETS 239  --  279   NEUTROS ABS  --   --  4.73   IMMATURE GRANS (ABS)  --   --  0.04   LYMPHS ABS  --   --  3.50*   MONOS ABS  --   --  0.69   EOS ABS  --   --  0.26   MCV 86.7  --  86.9   SED RATE  --  15  --    CRP  --   --  0.85*   PROCALCITONIN  --   --  <0.02   LACTATE  --   --  0.6         Lab 06/15/23  0441 06/14/23  2039   SODIUM 136 140   POTASSIUM 3.8 4.0   CHLORIDE 104 105   CO2 25.0 26.0   ANION GAP 7.0 9.0   BUN 13 15   CREATININE 0.67 0.70   EGFR 117.8 116.6   GLUCOSE 77 108*   CALCIUM 8.2* 8.9         Lab 06/15/23  0441 06/14/23  2039   TOTAL PROTEIN 6.4 7.3   ALBUMIN 3.5 4.0   GLOBULIN 2.9 3.3   ALT (SGPT) 5 8   AST (SGOT) 21 22   BILIRUBIN 0.3 0.2   ALK PHOS 76 84                     Brief Urine Lab  Results  (Last result in the past 365 days)        Color   Clarity   Blood   Leuk Est   Nitrite   Protein   CREAT   Urine HCG        06/15/23 0045 Yellow   Clear   Trace   Negative   Negative   Negative                 Microbiology Results (last 10 days)       Procedure Component Value - Date/Time    Wound Culture - Wound, Leg, Left [[id number]]  (Abnormal) Collected: 06/14/23 2140    Lab Status: Preliminary result Specimen: Wound from Leg, Left Updated: 06/16/23 1044     Wound Culture Scant growth (1+) Gram Negative Bacilli      Moderate growth (3+) Gram Positive Cocci     Gram Stain Rare (1+) WBCs seen      No organisms seen    Blood Culture - Blood, Arm, Right [[id number]]  (Normal) Collected: 06/14/23 2130    Lab Status: Preliminary result Specimen: Blood from Arm, Right Updated: 06/15/23 2215     Blood Culture No growth at 24 hours    Blood Culture - Blood, Arm, Left [[id number]]  (Normal) Collected: 06/14/23 2115    Lab Status: Preliminary result Specimen: Blood from Arm, Left Updated: 06/15/23 2215     Blood Culture No growth at 24 hours            CT Lower Extremity Left With Contrast    Result Date: 6/14/2023  CT LOWER EXTREMITY LEFT W CONTRAST Date of Exam: 6/14/2023 10:31 PM EDT Indication: Non-healing wound of left calf. Comparison: None available. Technique: Axial CT images were obtained of the left lower extremity after the uneventful intravenous administration of 90 mL Isovue 300.  Reconstructed coronal and sagittal images were also obtained. Automated exposure control and iterative construction  methods were used. Findings: The osseous structures of the calf are intact, without evidence of fracture or suspicious lytic or sclerotic lesion. Consistent with provided history, there is a small focal area of ulceration noted posteriorly, with some mild underlying soft tissue thickening present. There is no evidence of focal fluid collection more specifically concerning for abscess and inflammatory change does  not definitely extend deep to the adjacent fascia.     Impression: 14 mm ulcerated area along the posterior calf with some mild soft tissue edema consistent with provided history. There is no evidence of focal fluid collection concerning for abscess. Electronically Signed: Nilesh Werner  6/14/2023 10:54 PM EDT  Workstation ID: [id number]                 Plan for Follow-up of Pending Labs/Results: wound culture to f/u  Pending Labs       Order Current Status    Blood Culture - Blood, Arm, Left Preliminary result    Blood Culture - Blood, Arm, Right Preliminary result    Wound Culture - Wound, Leg, Left Preliminary result          Discharge Details        Discharge Medications        New Medications        Instructions Start Date   sulfamethoxazole-trimethoprim 800-160 MG per tablet  Commonly known as: Bactrim DS   1 tablet, Oral, 2 Times Daily             Continue These Medications        Instructions Start Date   ARIPiprazole 15 MG tablet  Commonly known as: ABILIFY   15 mg, Oral, Daily      busPIRone 10 MG tablet  Commonly known as: BUSPAR   10 mg, Oral, 2 Times Daily      FLUoxetine 20 MG capsule  Commonly known as: PROzac   20 mg, Oral, Daily      hydrOXYzine 25 MG tablet  Commonly known as: ATARAX   25 mg, Oral, 3 Times Daily PRN      melatonin 5 MG tablet tablet   5 mg, Oral      methadone 10 MG tablet  Commonly known as: DOLOPHINE   10 mg, Oral, Daily, Pt takes 105 mg daily               No Known Allergies      Discharge Disposition:  Home or Self Care    Diet:  Hospital:No active diet order      Activity:      Restrictions or Other Recommendations:  No further fentanyl use       CODE STATUS:    Code Status and Medical Interventions:   Ordered at: 06/14/23 7436     Level Of Support Discussed With:    Patient     Code Status (Patient has no pulse and is not breathing):    CPR (Attempt to Resuscitate)     Medical Interventions (Patient has pulse or is breathing):    Full Support       Future Appointments   Date  Time Provider Department Center   6/19/2023 11:00 AM Robles Hollingsworth, PT [facility]   6/22/2023  3:15 PM Elizabet Guajardo, PT [facility]       Additional Instructions for the Follow-ups that You Need to Schedule       Ambulatory Referral to Physical Therapy Wound Care   As directed      Specialty needed: Wound Care    Follow-up needed: Yes         Discharge Follow-up with Specified Provider: PT wound clinic within 1 week *please set up before discharge   As directed      To: PT wound clinic within 1 week *please set up before discharge         Referral to Wound Clinic   As directed                    Marilyn Marsh MD  06/16/23      Time Spent on Discharge:  I spent  20 minutes on this discharge activity which included: face-to-face encounter with the patient, reviewing the data in the system, coordination of the care with the nursing staff as well as consultants, documentation, and entering orders.

## 2023-06-16 NOTE — CASE MANAGEMENT/SOCIAL WORK
Case Management Discharge Note      Final Note: Pt is being discharged today and plan is home with spouse. Pt plans to go to outpatient wound PT at [facility]. Her 1st appointment has been made and will be in AVS.  Pt is agreeable to discharge plan and family will transport pt home. Pt denies discharge needs.         Selected Continued Care - Discharged on 6/16/2023 Admission date: 6/14/2023 - Discharge disposition: Home or Self Care      Destination    No services have been selected for the patient.                Durable Medical Equipment    No services have been selected for the patient.                Dialysis/Infusion    No services have been selected for the patient.                Home Medical Care    No services have been selected for the patient.                Therapy    No services have been selected for the patient.                Community Resources    No services have been selected for the patient.                Community & DME    No services have been selected for the patient.                         Final Discharge Disposition Code: 01 - home or self-care

## 2023-06-16 NOTE — PROGRESS NOTES
"Pharmacy Consult-Vancomycin Dosing  Elda Salazar is a  33 y.o. female receiving vancomycin therapy.     Indication:SSTI  Consulting Provider: hospitalist  ID Consult: not at this time    Goal AUC: 400 - 600 mg/L*hr    Current Antimicrobial Therapy  Vancomycin day 2  Ceftriaxone 2 g q24 hours    Allergies  Allergies as of 06/14/2023    (No Known Allergies)       Labs    Results from last 7 days   Lab Units 06/15/23  0441 06/14/23 2039   BUN mg/dL 13 15   CREATININE mg/dL 0.67 0.70       Results from last 7 days   Lab Units 06/15/23  0441 06/14/23 2039   WBC 10*3/mm3 6.81 9.28       Evaluation of Dosing     Last Dose Received in the ED/Outside Facility: 6/15 @ 0123  Is Patient on Dialysis or Renal Replacement: no    Ht - 154.9 cm (61\")  Wt - 89.1 kg (196 lb 6.4 oz)    Estimated Creatinine Clearance: 120.1 mL/min (by C-G formula based on SCr of 0.67 mg/dL).    Intake & Output (last 3 days)         06/13 0701  06/14 0700 06/14 0701  06/15 0700 06/15 0701  06/16 0700 06/16 0701  06/17 0700    IV Piggyback  50      Total Intake(mL/kg)  50 (0.6)      Net  +50                      Microbiology and Radiology  Microbiology Results (last 10 days)       Procedure Component Value - Date/Time    Wound Culture - Wound, Leg, Left [[id number]] Collected: 06/14/23 2140    Lab Status: Preliminary result Specimen: Wound from Leg, Left Updated: 06/15/23 0647     Gram Stain Rare (1+) WBCs seen      No organisms seen    Blood Culture - Blood, Arm, Right [[id number]]  (Normal) Collected: 06/14/23 2130    Lab Status: Preliminary result Specimen: Blood from Arm, Right Updated: 06/15/23 2215     Blood Culture No growth at 24 hours    Blood Culture - Blood, Arm, Left [[id number]]  (Normal) Collected: 06/14/23 2115    Lab Status: Preliminary result Specimen: Blood from Arm, Left Updated: 06/15/23 2215     Blood Culture No growth at 24 hours            Reported Vancomycin Levels    Results from last 7 days   Lab Units 06/16/23  0515   VANCOMYCIN " RM mcg/mL 21.90                      [employer] AUC Calculation:    Current AUC:  417  mg/L*hr    Predicted Steady State AUC on Current Dose: 490 mg/L*hr  _________________________________    Predicted Steady State AUC on New Dose:    mg/L*hr    Assessment/Plan:  Patient receiving vancomycin therapy for SSTI  Goal AUC: 400-600 mg/L*hr  6/15 Scr: 0.67 mg/dL  6/15 WBC: 6.81  6/16 Vancomycin random 21.9 mcg/mL ~ 2.5 hrs after end of infusion    Continue planned vancomycin 1250 mg every 12 hours  Obtain random on 6/18 AM  BMP x 3 days  Monitor renal function, clinical status, and infusion related reactions.  Follow vancomycin levels and adjust dose accordingly.     Thanks,  Kenny Martin, Pharmacy Intern  6/16/2023  08:04 EDT

## 2023-06-16 NOTE — PROGRESS NOTES
CTS Progress Note           LOS: 2 days     Subjective:  No acute events overnight.    Objective    Vital Signs  Temp:  [97.8 °F (36.6 °C)-98.4 °F (36.9 °C)] 97.8 °F (36.6 °C)  Heart Rate:  [55-72] 60  Resp:  [18] 18  BP: (108-120)/(70-94) 120/82    Physical Exam:   General Appearance: alert, appears stated age and cooperative   Skin: left calf wound with eschar. No purulent drainage.     Results     Results from last 7 days   Lab Units 06/15/23  0441   WBC 10*3/mm3 6.81   HEMOGLOBIN g/dL 12.9   HEMATOCRIT % 37.9   PLATELETS 10*3/mm3 239     Results from last 7 days   Lab Units 06/15/23  0441   SODIUM mmol/L 136   POTASSIUM mmol/L 3.8   CHLORIDE mmol/L 104   CO2 mmol/L 25.0   BUN mg/dL 13   CREATININE mg/dL 0.67   GLUCOSE mg/dL 77   CALCIUM mg/dL 8.2*       Imaging Results (Last 24 Hours)       ** No results found for the last 24 hours. **            Assessment    Non-healing ulcer of lower leg, left, with unspecified severity    History of substance abuse    Bipolar disorder, in partial remission, most recent episode mixed    Cellulitis of left lower extremity      Plan   Consult wound care  Antibiotics  No surgical intervention at this time    Sarah Christie PA-C  06/16/23  07:39 EDT    --    I reviewed this documentation. I interviewed and examined this patient. Please see my consultation note for full detail. Plan as documented.     Aakash Carroll M.D., R.P.V.I.  Cardiothoracic and Vascular Surgeon  [facility]

## 2023-06-17 LAB
BACTERIA SPEC AEROBE CULT: ABNORMAL
BACTERIA SPEC AEROBE CULT: ABNORMAL
GRAM STN SPEC: ABNORMAL
GRAM STN SPEC: ABNORMAL

## 2023-06-17 RX ORDER — CEFDINIR 300 MG/1
300 CAPSULE ORAL 2 TIMES DAILY
Qty: 14 CAPSULE | Refills: 0 | Status: SHIPPED | OUTPATIENT
Start: 2023-06-17 | End: 2023-06-24

## 2023-06-19 LAB
BACTERIA SPEC AEROBE CULT: NORMAL
BACTERIA SPEC AEROBE CULT: NORMAL

## 2023-11-24 ENCOUNTER — DOCUMENTATION (OUTPATIENT)
Dept: PHYSICAL THERAPY | Facility: HOSPITAL | Age: 33
End: 2023-11-24
Payer: MEDICAID

## 2023-11-24 DIAGNOSIS — L03.116 CELLULITIS AND ABSCESS OF LEFT LEG: Primary | ICD-10-CM

## 2023-11-24 DIAGNOSIS — L02.416 CELLULITIS AND ABSCESS OF LEFT LEG: Primary | ICD-10-CM

## 2023-11-24 NOTE — THERAPY DISCHARGE NOTE
Outpatient Rehabilitation - Wound/Debridement Discharge Summary       Patient Name: Elda Salazar  : 1990  MRN: [id number]  Today's Date: 2023                  Admit Date: (Not on file)    Visit Dx:    ICD-10-CM ICD-9-CM   1. Cellulitis and abscess of left leg  L03.116 682.6    L02.416        Patient Active Problem List   Diagnosis    Non-healing ulcer of lower leg, left, with unspecified severity    History of substance abuse    Bipolar disorder, in partial remission, most recent episode mixed    Fentanyl adverse reaction        Past Medical History:   Diagnosis Date    Bipolar disorder     Polysubstance abuse         Past Surgical History:   Procedure Laterality Date    TONSILLECTOMY         Goals   PT OP Goals       Row Name 23 1600          PT Short Term Goals    STG 1 Patient to verbalize S&S of infection and when to seek medical attention.  -     STG 1 Progress Not Met  -     STG 2 Reduce wound dimensions by 50% as evidence of wound healing.  -     STG 2 Progress Not Met  -        Long Term Goals    LTG 1 Patient/family independent with home dressing changes.  -     LTG 1 Progress Not Met  -     LTG 2 Left calf wound to be closed/resurfaced to allow for return to prior activities.  -     LTG 2 Progress Not Met  -               User Key  (r) = Recorded By, (t) = Taken By, (c) = Cosigned By      Initials Name Provider Type    Elizabet Bell, PT Physical Therapist                   OP Discharge Summary       Row Name 23 1615             OP PT Discharge Summary    Date of Discharge 23  -      Reason for Discharge other (comment)  Patient did not return for tx after initial evaluation  -      Outcomes Achieved Unable to make functional progress toward goals at this time  unable to assess due to pt not returning for tx  -      Discharge Destination Unknown  -                User Key  (r) = Recorded By, (t) = Taken By, (c) = Cosigned By      Initials  Name Provider Type    Elizabet Bell, PT Physical Therapist                    Elizabet Guajardo, PT  11/24/2023